# Patient Record
Sex: FEMALE | Race: WHITE | NOT HISPANIC OR LATINO | Employment: FULL TIME | URBAN - METROPOLITAN AREA
[De-identification: names, ages, dates, MRNs, and addresses within clinical notes are randomized per-mention and may not be internally consistent; named-entity substitution may affect disease eponyms.]

---

## 2019-01-18 ENCOUNTER — ANESTHESIA (OUTPATIENT)
Dept: LABOR AND DELIVERY | Facility: HOSPITAL | Age: 34
End: 2019-01-18

## 2019-01-18 ENCOUNTER — ANESTHESIA EVENT (OUTPATIENT)
Dept: LABOR AND DELIVERY | Facility: HOSPITAL | Age: 34
End: 2019-01-18

## 2019-01-18 ENCOUNTER — HOSPITAL ENCOUNTER (INPATIENT)
Facility: HOSPITAL | Age: 34
LOS: 4 days | Discharge: HOME | End: 2019-01-22
Attending: OBSTETRICS & GYNECOLOGY | Admitting: OBSTETRICS & GYNECOLOGY

## 2019-01-18 PROBLEM — Z34.90 PREGNANCY: Status: ACTIVE | Noted: 2019-01-18

## 2019-01-18 LAB
ABO GROUP BLD: NORMAL
ABO GROUP BLD: NORMAL
ANISOCYTOSIS BLD QL: NORMAL
ATMOSPHERIC PRESS: 760.2 MMHG
BASE EXCESS BLDCOV CALC-SCNC: -2.1 MMOL/L (ref -30–30)
BASOPHILS # BLD AUTO: 0.03 10*3/MM3 (ref 0–0.2)
BASOPHILS NFR BLD AUTO: 0.2 % (ref 0–1.5)
BDY SITE: ABNORMAL
BLD GP AB SCN SERPL QL: POSITIVE
DEPRECATED RDW RBC AUTO: 40.5 FL (ref 37–54)
EOSINOPHIL # BLD AUTO: 0.07 10*3/MM3 (ref 0–0.7)
EOSINOPHIL NFR BLD AUTO: 0.5 % (ref 0.3–6.2)
ERYTHROCYTE [DISTWIDTH] IN BLOOD BY AUTOMATED COUNT: 16 % (ref 11.7–13)
FETAL BLEED: NEGATIVE
GAS FLOW AIRWAY: 10 LPM
HCO3 BLDCOV-SCNC: 23.5 MMOL/L
HCT VFR BLD AUTO: 33.6 % (ref 35.6–45.5)
HGB BLD-MCNC: 10.2 G/DL (ref 11.9–15.5)
HYPOCHROMIA BLD QL: NORMAL
IMM GRANULOCYTES # BLD AUTO: 0.08 10*3/MM3 (ref 0–0.03)
IMM GRANULOCYTES NFR BLD AUTO: 0.5 % (ref 0–0.5)
LYMPHOCYTES # BLD AUTO: 1.64 10*3/MM3 (ref 0.9–4.8)
LYMPHOCYTES NFR BLD AUTO: 10.8 % (ref 19.6–45.3)
MCH RBC QN AUTO: 21.6 PG (ref 26.9–32)
MCHC RBC AUTO-ENTMCNC: 30.4 G/DL (ref 32.4–36.3)
MCV RBC AUTO: 71.2 FL (ref 80.5–98.2)
MICROCYTES BLD QL: NORMAL
MODALITY: ABNORMAL
MONOCYTES # BLD AUTO: 0.72 10*3/MM3 (ref 0.2–1.2)
MONOCYTES NFR BLD AUTO: 4.7 % (ref 5–12)
NEUTROPHILS # BLD AUTO: 12.63 10*3/MM3 (ref 1.9–8.1)
NEUTROPHILS NFR BLD AUTO: 83.3 % (ref 42.7–76)
NOTE: ABNORMAL
NUMBER OF DOSES: NORMAL
PCO2 BLDCOV: 42.5 MM HG (ref 35–51.3)
PH BLDCOV: 7.35 PH UNITS (ref 7.26–7.4)
PLAT MORPH BLD: NORMAL
PLATELET # BLD AUTO: 254 10*3/MM3 (ref 140–500)
PMV BLD AUTO: 10.8 FL (ref 6–12)
PO2 BLDCOV: 31.6 MM HG (ref 19–39)
POLYCHROMASIA BLD QL SMEAR: NORMAL
RBC # BLD AUTO: 4.72 10*6/MM3 (ref 3.9–5.2)
RESIDUAL RHIG DETECTED: NORMAL
RH BLD: NEGATIVE
RH BLD: NEGATIVE
SAO2 % BLDCOA: 57.4 % (ref 92–99)
SAO2 % BLDCOV: ABNORMAL %
STOMATOCYTES BLD QL SMEAR: NORMAL
T&S EXPIRATION DATE: NORMAL
VENTILATOR MODE: ABNORMAL
WBC MORPH BLD: NORMAL
WBC NRBC COR # BLD: 15.17 10*3/MM3 (ref 4.5–10.7)

## 2019-01-18 PROCEDURE — 86900 BLOOD TYPING SEROLOGIC ABO: CPT | Performed by: OBSTETRICS & GYNECOLOGY

## 2019-01-18 PROCEDURE — 25010000002 MORPHINE PER 10 MG: Performed by: ANESTHESIOLOGY

## 2019-01-18 PROCEDURE — 86901 BLOOD TYPING SEROLOGIC RH(D): CPT | Performed by: OBSTETRICS & GYNECOLOGY

## 2019-01-18 PROCEDURE — 86870 RBC ANTIBODY IDENTIFICATION: CPT | Performed by: OBSTETRICS & GYNECOLOGY

## 2019-01-18 PROCEDURE — 85461 HEMOGLOBIN FETAL: CPT | Performed by: OBSTETRICS & GYNECOLOGY

## 2019-01-18 PROCEDURE — 85007 BL SMEAR W/DIFF WBC COUNT: CPT | Performed by: OBSTETRICS & GYNECOLOGY

## 2019-01-18 PROCEDURE — 88307 TISSUE EXAM BY PATHOLOGIST: CPT

## 2019-01-18 PROCEDURE — 25010000002 PROMETHAZINE PER 50 MG: Performed by: ANESTHESIOLOGY

## 2019-01-18 PROCEDURE — 25010000002 RHO D IMMUNE GLOBULIN 1500 UNIT/2ML SOLUTION PREFILLED SYRINGE: Performed by: OBSTETRICS & GYNECOLOGY

## 2019-01-18 PROCEDURE — 25010000002 PHENYLEPHRINE PER 1 ML: Performed by: NURSE ANESTHETIST, CERTIFIED REGISTERED

## 2019-01-18 PROCEDURE — 25010000002 ONDANSETRON PER 1 MG: Performed by: ANESTHESIOLOGY

## 2019-01-18 PROCEDURE — 85025 COMPLETE CBC W/AUTO DIFF WBC: CPT | Performed by: OBSTETRICS & GYNECOLOGY

## 2019-01-18 PROCEDURE — 25010000003 CEFAZOLIN IN DEXTROSE 2-4 GM/100ML-% SOLUTION: Performed by: OBSTETRICS & GYNECOLOGY

## 2019-01-18 PROCEDURE — 86850 RBC ANTIBODY SCREEN: CPT | Performed by: OBSTETRICS & GYNECOLOGY

## 2019-01-18 PROCEDURE — 82803 BLOOD GASES ANY COMBINATION: CPT

## 2019-01-18 RX ORDER — OXYTOCIN-SODIUM CHLORIDE 0.9% IV SOLN 30 UNIT/500ML 30-0.9/5 UT/ML-%
125 SOLUTION INTRAVENOUS CONTINUOUS PRN
Status: COMPLETED | OUTPATIENT
Start: 2019-01-18 | End: 2019-01-18

## 2019-01-18 RX ORDER — PRENATAL VIT NO.126/IRON/FOLIC 28MG-0.8MG
1 TABLET ORAL DAILY
Status: DISCONTINUED | OUTPATIENT
Start: 2019-01-18 | End: 2019-01-22 | Stop reason: HOSPADM

## 2019-01-18 RX ORDER — ONDANSETRON 2 MG/ML
4 INJECTION INTRAMUSCULAR; INTRAVENOUS ONCE AS NEEDED
Status: COMPLETED | OUTPATIENT
Start: 2019-01-18 | End: 2019-01-18

## 2019-01-18 RX ORDER — BUPIVACAINE HYDROCHLORIDE 7.5 MG/ML
INJECTION, SOLUTION INTRASPINAL
Status: DISPENSED
Start: 2019-01-18 | End: 2019-01-18

## 2019-01-18 RX ORDER — ACETAMINOPHEN 500 MG
500 TABLET ORAL EVERY 6 HOURS PRN
COMMUNITY
End: 2019-01-22 | Stop reason: HOSPADM

## 2019-01-18 RX ORDER — DIPHENHYDRAMINE HYDROCHLORIDE 50 MG/ML
25 INJECTION INTRAMUSCULAR; INTRAVENOUS EVERY 4 HOURS PRN
Status: DISCONTINUED | OUTPATIENT
Start: 2019-01-18 | End: 2019-01-22 | Stop reason: HOSPADM

## 2019-01-18 RX ORDER — SODIUM CHLORIDE 0.9 % (FLUSH) 0.9 %
3 SYRINGE (ML) INJECTION EVERY 12 HOURS SCHEDULED
Status: DISCONTINUED | OUTPATIENT
Start: 2019-01-18 | End: 2019-01-18

## 2019-01-18 RX ORDER — SODIUM CHLORIDE 0.9 % (FLUSH) 0.9 %
3-10 SYRINGE (ML) INJECTION AS NEEDED
Status: DISCONTINUED | OUTPATIENT
Start: 2019-01-18 | End: 2019-01-18

## 2019-01-18 RX ORDER — ONDANSETRON 4 MG/1
4 TABLET, FILM COATED ORAL EVERY 8 HOURS PRN
Status: DISCONTINUED | OUTPATIENT
Start: 2019-01-18 | End: 2019-01-22 | Stop reason: HOSPADM

## 2019-01-18 RX ORDER — FAMOTIDINE 10 MG/ML
20 INJECTION, SOLUTION INTRAVENOUS ONCE AS NEEDED
Status: DISCONTINUED | OUTPATIENT
Start: 2019-01-18 | End: 2019-01-18

## 2019-01-18 RX ORDER — CARBOPROST TROMETHAMINE 250 UG/ML
250 INJECTION, SOLUTION INTRAMUSCULAR AS NEEDED
Status: DISCONTINUED | OUTPATIENT
Start: 2019-01-18 | End: 2019-01-18 | Stop reason: HOSPADM

## 2019-01-18 RX ORDER — OXYTOCIN-SODIUM CHLORIDE 0.9% IV SOLN 30 UNIT/500ML 30-0.9/5 UT/ML-%
999 SOLUTION INTRAVENOUS ONCE
Status: COMPLETED | OUTPATIENT
Start: 2019-01-18 | End: 2019-01-18

## 2019-01-18 RX ORDER — ERYTHROMYCIN 5 MG/G
OINTMENT OPHTHALMIC
Status: DISPENSED
Start: 2019-01-18 | End: 2019-01-18

## 2019-01-18 RX ORDER — SIMETHICONE 80 MG
80 TABLET,CHEWABLE ORAL 4 TIMES DAILY PRN
Status: DISCONTINUED | OUTPATIENT
Start: 2019-01-18 | End: 2019-01-22 | Stop reason: HOSPADM

## 2019-01-18 RX ORDER — IBUPROFEN 600 MG/1
600 TABLET ORAL EVERY 8 HOURS PRN
Status: DISCONTINUED | OUTPATIENT
Start: 2019-01-18 | End: 2019-01-22 | Stop reason: HOSPADM

## 2019-01-18 RX ORDER — OXYCODONE AND ACETAMINOPHEN 10; 325 MG/1; MG/1
1 TABLET ORAL EVERY 4 HOURS PRN
Status: DISCONTINUED | OUTPATIENT
Start: 2019-01-18 | End: 2019-01-22 | Stop reason: HOSPADM

## 2019-01-18 RX ORDER — PROMETHAZINE HYDROCHLORIDE 25 MG/ML
12.5 INJECTION, SOLUTION INTRAMUSCULAR; INTRAVENOUS EVERY 4 HOURS PRN
Status: DISCONTINUED | OUTPATIENT
Start: 2019-01-18 | End: 2019-01-22 | Stop reason: HOSPADM

## 2019-01-18 RX ORDER — FAMOTIDINE 10 MG/ML
20 INJECTION, SOLUTION INTRAVENOUS ONCE AS NEEDED
Status: DISCONTINUED | OUTPATIENT
Start: 2019-01-18 | End: 2019-01-18 | Stop reason: SDUPTHER

## 2019-01-18 RX ORDER — METHYLERGONOVINE MALEATE 0.2 MG/ML
200 INJECTION INTRAVENOUS ONCE AS NEEDED
Status: DISCONTINUED | OUTPATIENT
Start: 2019-01-18 | End: 2019-01-18 | Stop reason: HOSPADM

## 2019-01-18 RX ORDER — PROMETHAZINE HYDROCHLORIDE 25 MG/ML
12.5 INJECTION, SOLUTION INTRAMUSCULAR; INTRAVENOUS EVERY 6 HOURS PRN
Status: DISCONTINUED | OUTPATIENT
Start: 2019-01-18 | End: 2019-01-18

## 2019-01-18 RX ORDER — BUPIVACAINE HYDROCHLORIDE 7.5 MG/ML
INJECTION, SOLUTION EPIDURAL; RETROBULBAR
Status: COMPLETED | OUTPATIENT
Start: 2019-01-18 | End: 2019-01-18

## 2019-01-18 RX ORDER — DOCUSATE SODIUM 100 MG/1
100 CAPSULE, LIQUID FILLED ORAL 2 TIMES DAILY PRN
Status: DISCONTINUED | OUTPATIENT
Start: 2019-01-18 | End: 2019-01-22 | Stop reason: HOSPADM

## 2019-01-18 RX ORDER — ACETAMINOPHEN 500 MG
1000 TABLET ORAL ONCE
Status: DISCONTINUED | OUTPATIENT
Start: 2019-01-18 | End: 2019-01-18 | Stop reason: SDUPTHER

## 2019-01-18 RX ORDER — LIDOCAINE HYDROCHLORIDE 10 MG/ML
5 INJECTION, SOLUTION EPIDURAL; INFILTRATION; INTRACAUDAL; PERINEURAL AS NEEDED
Status: DISCONTINUED | OUTPATIENT
Start: 2019-01-18 | End: 2019-01-18

## 2019-01-18 RX ORDER — LANOLIN
CREAM (ML) TOPICAL
Status: DISCONTINUED | OUTPATIENT
Start: 2019-01-18 | End: 2019-01-22 | Stop reason: HOSPADM

## 2019-01-18 RX ORDER — ONDANSETRON 2 MG/ML
4 INJECTION INTRAMUSCULAR; INTRAVENOUS ONCE AS NEEDED
Status: DISCONTINUED | OUTPATIENT
Start: 2019-01-18 | End: 2019-01-18 | Stop reason: SDUPTHER

## 2019-01-18 RX ORDER — ACETAMINOPHEN 325 MG/1
650 TABLET ORAL EVERY 4 HOURS PRN
Status: DISCONTINUED | OUTPATIENT
Start: 2019-01-18 | End: 2019-01-22 | Stop reason: HOSPADM

## 2019-01-18 RX ORDER — RANITIDINE 150 MG/1
150 TABLET ORAL 2 TIMES DAILY
COMMUNITY
End: 2019-01-22 | Stop reason: HOSPADM

## 2019-01-18 RX ORDER — MORPHINE SULFATE 1 MG/ML
INJECTION, SOLUTION EPIDURAL; INTRATHECAL; INTRAVENOUS
Status: COMPLETED
Start: 2019-01-18 | End: 2019-01-18

## 2019-01-18 RX ORDER — OXYTOCIN-SODIUM CHLORIDE 0.9% IV SOLN 30 UNIT/500ML 30-0.9/5 UT/ML-%
250 SOLUTION INTRAVENOUS CONTINUOUS
Status: ACTIVE | OUTPATIENT
Start: 2019-01-18 | End: 2019-01-18

## 2019-01-18 RX ORDER — MORPHINE SULFATE 1 MG/ML
INJECTION, SOLUTION EPIDURAL; INTRATHECAL; INTRAVENOUS
Status: COMPLETED | OUTPATIENT
Start: 2019-01-18 | End: 2019-01-18

## 2019-01-18 RX ORDER — DIPHENHYDRAMINE HCL 25 MG
25 CAPSULE ORAL EVERY 4 HOURS PRN
Status: DISCONTINUED | OUTPATIENT
Start: 2019-01-18 | End: 2019-01-22 | Stop reason: HOSPADM

## 2019-01-18 RX ORDER — PRENATAL VIT NO.126/IRON/FOLIC 28MG-0.8MG
TABLET ORAL DAILY
COMMUNITY
End: 2021-02-01 | Stop reason: HOSPADM

## 2019-01-18 RX ORDER — PROMETHAZINE HYDROCHLORIDE 25 MG/ML
25 INJECTION, SOLUTION INTRAMUSCULAR; INTRAVENOUS EVERY 6 HOURS PRN
Status: DISCONTINUED | OUTPATIENT
Start: 2019-01-18 | End: 2019-01-22 | Stop reason: HOSPADM

## 2019-01-18 RX ORDER — OXYCODONE HYDROCHLORIDE AND ACETAMINOPHEN 5; 325 MG/1; MG/1
1 TABLET ORAL EVERY 4 HOURS PRN
Status: DISCONTINUED | OUTPATIENT
Start: 2019-01-18 | End: 2019-01-22 | Stop reason: HOSPADM

## 2019-01-18 RX ORDER — MISOPROSTOL 200 UG/1
800 TABLET ORAL AS NEEDED
Status: DISCONTINUED | OUTPATIENT
Start: 2019-01-18 | End: 2019-01-18 | Stop reason: HOSPADM

## 2019-01-18 RX ORDER — NALOXONE HCL 0.4 MG/ML
0.2 VIAL (ML) INJECTION
Status: DISCONTINUED | OUTPATIENT
Start: 2019-01-18 | End: 2019-01-22 | Stop reason: HOSPADM

## 2019-01-18 RX ORDER — ACETAMINOPHEN 500 MG
1000 TABLET ORAL ONCE
Status: COMPLETED | OUTPATIENT
Start: 2019-01-18 | End: 2019-01-18

## 2019-01-18 RX ORDER — MORPHINE SULFATE 2 MG/ML
2 INJECTION, SOLUTION INTRAMUSCULAR; INTRAVENOUS
Status: ACTIVE | OUTPATIENT
Start: 2019-01-18 | End: 2019-01-19

## 2019-01-18 RX ORDER — HYDROMORPHONE HYDROCHLORIDE 1 MG/ML
0.5 INJECTION, SOLUTION INTRAMUSCULAR; INTRAVENOUS; SUBCUTANEOUS
Status: ACTIVE | OUTPATIENT
Start: 2019-01-18 | End: 2019-01-19

## 2019-01-18 RX ORDER — CEFAZOLIN SODIUM 2 G/100ML
2 INJECTION, SOLUTION INTRAVENOUS ONCE
Status: COMPLETED | OUTPATIENT
Start: 2019-01-18 | End: 2019-01-18

## 2019-01-18 RX ORDER — FAMOTIDINE 20 MG/1
20 TABLET, FILM COATED ORAL DAILY
Status: DISCONTINUED | OUTPATIENT
Start: 2019-01-19 | End: 2019-01-22 | Stop reason: HOSPADM

## 2019-01-18 RX ORDER — PHYTONADIONE 2 MG/ML
INJECTION, EMULSION INTRAMUSCULAR; INTRAVENOUS; SUBCUTANEOUS
Status: DISPENSED
Start: 2019-01-18 | End: 2019-01-18

## 2019-01-18 RX ORDER — SODIUM CHLORIDE, SODIUM LACTATE, POTASSIUM CHLORIDE, CALCIUM CHLORIDE 600; 310; 30; 20 MG/100ML; MG/100ML; MG/100ML; MG/100ML
125 INJECTION, SOLUTION INTRAVENOUS CONTINUOUS
Status: DISCONTINUED | OUTPATIENT
Start: 2019-01-18 | End: 2019-01-18

## 2019-01-18 RX ADMIN — CEFAZOLIN SODIUM 2 G: 2 INJECTION, SOLUTION INTRAVENOUS at 11:27

## 2019-01-18 RX ADMIN — MORPHINE SULFATE 200 MCG: 1 INJECTION EPIDURAL; INTRATHECAL; INTRAVENOUS at 11:38

## 2019-01-18 RX ADMIN — OXYTOCIN 999 ML/HR: 10 INJECTION INTRAVENOUS at 11:51

## 2019-01-18 RX ADMIN — PHENYLEPHRINE HYDROCHLORIDE 200 MCG: 10 INJECTION INTRAVENOUS at 11:45

## 2019-01-18 RX ADMIN — ACETAMINOPHEN 1000 MG: 500 TABLET, FILM COATED ORAL at 11:27

## 2019-01-18 RX ADMIN — HUMAN RHO(D) IMMUNE GLOBULIN 1500 UNITS: 1500 SOLUTION INTRAMUSCULAR; INTRAVENOUS at 16:51

## 2019-01-18 RX ADMIN — OXYTOCIN 125 ML/HR: 10 INJECTION INTRAVENOUS at 13:42

## 2019-01-18 RX ADMIN — SODIUM CHLORIDE, POTASSIUM CHLORIDE, SODIUM LACTATE AND CALCIUM CHLORIDE 125 ML/HR: 600; 310; 30; 20 INJECTION, SOLUTION INTRAVENOUS at 11:20

## 2019-01-18 RX ADMIN — BUPIVACAINE HYDROCHLORIDE 1.8 ML: 7.5 INJECTION, SOLUTION EPIDURAL; RETROBULBAR at 11:38

## 2019-01-18 RX ADMIN — IBUPROFEN 600 MG: 600 TABLET ORAL at 16:33

## 2019-01-18 RX ADMIN — PHENYLEPHRINE HYDROCHLORIDE 200 MCG: 10 INJECTION INTRAVENOUS at 11:41

## 2019-01-18 RX ADMIN — PROMETHAZINE HYDROCHLORIDE 12.5 MG: 25 INJECTION INTRAMUSCULAR; INTRAVENOUS at 16:22

## 2019-01-18 RX ADMIN — ONDANSETRON 4 MG: 2 INJECTION INTRAMUSCULAR; INTRAVENOUS at 13:49

## 2019-01-18 RX ADMIN — SODIUM CHLORIDE, POTASSIUM CHLORIDE, SODIUM LACTATE AND CALCIUM CHLORIDE 1000 ML: 600; 310; 30; 20 INJECTION, SOLUTION INTRAVENOUS at 10:29

## 2019-01-18 RX ADMIN — SODIUM CHLORIDE, POTASSIUM CHLORIDE, SODIUM LACTATE AND CALCIUM CHLORIDE 500 ML: 600; 310; 30; 20 INJECTION, SOLUTION INTRAVENOUS at 12:37

## 2019-01-18 RX ADMIN — ONDANSETRON 4 MG: 2 INJECTION INTRAMUSCULAR; INTRAVENOUS at 11:27

## 2019-01-18 NOTE — ANESTHESIA PROCEDURE NOTES
Spinal Block      Patient location during procedure: OB  Performed By  Anesthesiologist: Suraj Molina MD  Preanesthetic Checklist  Completed: patient identified, site marked, surgical consent, pre-op evaluation, timeout performed, IV checked, risks and benefits discussed and monitors and equipment checked  Spinal Block Prep:  Patient Position:sitting  Sterile Tech:cap, gloves, mask and sterile barriers  Prep:Chloraprep  Patient Monitoring:blood pressure monitoring, continuous pulse oximetry and EKG  Spinal Block Procedure  Approach:midline  Guidance:landmark technique and palpation technique  Location:L4-L5  Needle Type:Sprotte  Needle Gauge:24  Placement of Spinal needle event:cerebrospinal fluid aspirated    Fluid Appearance:clear  Medications: Morphine PF injection, 200 mcg  bupivacaine PF (MARCAINE) 0.75 % injection, 1.8 mL   Post Assessment  Patient Tolerance:patient tolerated the procedure well with no apparent complications  Complications no  Additional Notes  1.8cc 0.75%bupivicaine in dextrose

## 2019-01-18 NOTE — H&P
[facility]  Obstetric History and Physical    Patient Name: Joanna Fleming  :  1985  MRN:  [id number]      Chief Complaint   Patient presents with   • Scheduled      pt presented to office with KASH of 2 and confirmed breech        Subjective     Patient is a 33 y.o. female  currently at 36w6d, who presents from clinic for delivery due to significant oligohydramnios with KASH 2 and breech presentation.       Her prenatal care has been otherwise uncomplicated.      Objective       Vital Signs Range for the last 24 hours  Temperature: Temp:  [98.1 °F (36.7 °C)] 98.1 °F (36.7 °C)   Temp Source: Temp src: Oral   BP: BP: (127-147)/(76-88) 137/85   Pulse: Heart Rate:  [104-130] 104   Respirations: Resp:  [18] 18                   Physical Examination:     General :  Alert in NAD  Abdomen: Gravid, EFW 7.5 by leopolds    Presentation: breech       Fetal Heart Rate Assessment   Method: Fetal HR Assessment Method: intermittent auscultation, using Doppler   Beats/min: Fetal HR (beats/min): 136   Baseline:     Varibility:     Accels:     Decels:     Tracing Category:       Uterine Assessment   Method:     Frequency (min):     Ctx Count in 10 min:     Duration:     Intensity:     Intensity by IUPC:     Resting Tone:     Resting Tone by IUPC:     [place] Units:           Results from last 7 days   Lab Units 19  1030   WBC 10*3/mm3 15.17*   HEMOGLOBIN g/dL 10.2*   HEMATOCRIT % 33.6*   PLATELETS 10*3/mm3 254       Assessment/Plan     1.  Intrauterine pregnancy at 36w6d weeks gestation with oligohydramnios KASH 2, breech, and NRFHT with normal baseline but minimal variability and no accels after fluid resuscitation.   Will proceed now with  delivery. Plan of care has been reviewed with patient and family.  All questions answered.      Pregnancy        H&P updated. The patient was examined and the following changes are noted above.         Maddi Rhoades MD  2019  12:33 PM

## 2019-01-18 NOTE — ANESTHESIA PREPROCEDURE EVALUATION
Anesthesia Evaluation     Patient summary reviewed and Nursing notes reviewed                Airway   Mallampati: II  TM distance: >3 FB  Neck ROM: full  Dental - normal exam     Pulmonary - negative pulmonary ROS and normal exam   Cardiovascular - negative cardio ROS and normal exam        Neuro/Psych  (+) headaches,     GI/Hepatic/Renal/Endo - negative ROS     Musculoskeletal (-) negative ROS    Abdominal    Substance History - negative use     OB/GYN    (+) Pregnant,         Other                        Anesthesia Plan    ASA 2     epidural     Anesthetic plan, all risks, benefits, and alternatives have been provided, discussed and informed consent has been obtained with: patient.

## 2019-01-18 NOTE — PLAN OF CARE
Problem: Patient Care Overview  Goal: Plan of Care Review  Outcome: Ongoing (interventions implemented as appropriate)   19 1250   Coping/Psychosocial   Plan of Care Reviewed With patient;spouse   Plan of Care Review   Progress improving   OTHER   Outcome Summary patient delivered via c/s for breech presentation and oligo. Mom and baby healthy. baby in nursery.     Goal: Individualization and Mutuality  Outcome: Ongoing (interventions implemented as appropriate)   19 1250   Individualization   Patient Specific Preferences baby updates as soon as possible   Patient Specific Goals (Include Timeframe) transfer to [facility] within hour   Patient Specific Interventions control pain below 4/10   Mutuality/Individual Preferences   What Anxieties, Fears, Concerns, or Questions Do You Have About Your Care? first child, first    What Information Would Help Us Give You More Personalized Care? patient is dermatology NP   How Would You and/or Your Support Person Like to Participate in Your Care? present at delivery and with baby in nursery   Mutuality/Individual Preferences   How to Address Anxieties/Fears explain plan of care

## 2019-01-18 NOTE — NURSING NOTE
CRNA at bedside to Fazal administration due to symptomatic low blood pressures. See anesthesia flow sheet for medication administration.

## 2019-01-18 NOTE — OP NOTE
[facility]   Section Operative Note    Patient Name: Joanna Fleming  :  1985  MRN:  [id number]      Date of procedure:  2019        Pre-Operative Dx:   1.  IUP at 36w6d weeks   2. Breech    3. Severe oligohydramnios, KASH 2     Postoperative Dx:  Same        Procedure: , Low Transverse      Surgeon: Maddi Rhoades MD      Assistant: Dr. Garcia     Anesthesia: Spinal    EBL: 600cc         Specimens: placenta     Findings:                           Amniotic Fluid minimal, clear  Placenta Intact, 3 VC  Uterus normal  Tubes and ovaries normal bilaterally              Infant:            Gender: Viable female  infant     Weight: 3345 g (7 lb 6 oz)     Apgars: 8   @ 1 minute /     8   @ 5 minutes                 Indication for C/Section:     Breech  , oligohydramnios              Procedure Details:  The patient was taken to the operating room where spinal anesthesia was found to be adequate. She was prepped and draped in the usual sterile manner in the dorsal supine position with leftward tilt. A Pfannenstiel incision was made and carried down to the fascia. The fascia was incised in the mid-line and extended transversely with Conley scissors. The fascia was grasped and elevated and  from the underlying rectus muscles superiorly and inferiorly. The peritoneum was identified and entered. Peritoneal incision was extended superiorly and inferiorly with good visualization of the bladder. The bladder blade was inserted and the vesicouterine peritoneum was incised transversely and the bladder flap was created digitally. The bladder blade was reinserted. The  lower uterine segment was incised in a transverse fashion.  The legs was elevated and delivered through the incision. The remainder of the infant was delivered without difficulty using standard breech maneuvers. The umbilical cord was clamped and cut and the infant was handed off the field. Cord ph and cord bloods were obtained. The  placenta was removed intact and appeared normal. The uterine incision was closed with running locked sutures of 0 chromic. Second layer closure was placed imbricating the first for hemostasis. The abdominal cavity was irrigated and cleared of all clot and debris. The uterine incision was inspected and noted to be hemostatic. Rectus muscles were reapproximated in the midline with 0 vicryl.  The fascia was closed with 0 vicryl in running continuous fashion. The subcutaneous tissue was closed with 3-0 vicryl. The skin was closed in subcuticular fashion with 4-0 Vicryl.   Instrument, sponge, and needle counts were correct prior the abdominal closure and at the conclusion of the case. Mother and baby  to recovery room in stable condition.              Complications:     None          Antibiotics: cefazolin (Ancef)                      Maddi Rhoades MD  1/18/2019  12:39 PM

## 2019-01-18 NOTE — LACTATION NOTE
P1. 36 wkr per leonardo/38 wks by dates.  Assisted with waking infant and latching.  Nipple shield used at first but then able to latch to left breast without for short amount of time.  HGP to bedside and pt to start tonight if infant skips a feeding or in the am for insurance pumping.  Reviewed feeding patterns, how to know infant getting enough, and milk supply.  Pt to call LC as needed.    Lactation Consult Note    Evaluation Completed: 2019 6:26 PM  Patient Name: Joanna Fleming  :  1985  MRN:  [id number]     REFERRAL  INFORMATION:                          Date of Referral: 19   Person Making Referral: patient  Maternal Reason for Referral: breastfeeding currently       DELIVERY HISTORY:          Skin to skin initiation date/time:        Skin to skin end date/time:              MATERNAL ASSESSMENT:  Breast Size Issue: none (19 : Jennifer Barros, RN)  Breast Shape: Bilateral:, pendulous (19 : Jennifer Barros, RN)  Breast Density: Bilateral:, soft (19 : Jennifer Barros, RN)  Areola: Bilateral:, elastic (19 : Jennifer Barros, RN)  Nipples: Bilateral:, graspable (19 : Jennifer Barros, RN)                INFANT ASSESSMENT:  Information for the patient's :  Nate Fleming [[id number]]   No past medical history on file.    Feeding Readiness Cues: rooting (19 : Jennifer Barros, RN)   Feeding Method: breastfeeding (19 : Jennifer Barros, RN)   Feeding Tolerance/Success: adequate pause for breath, suck inconsistent, suck weak (19 : Jennifer Barros, RN)       Satiety Cues: calm after feeding (19 : Jennifer Barros, RN)           Feeding Interventions: lips stroked, latch assistance provided, sucking promoted (19 : Jennifer Barros, RN)   Nutrition Interventions: lactation consult initiated (19 : Jennifer Barros, RN)   Additional Documentation: LATCH Score (Group) (19 : Fiorella  KELLY Rodriguez)           Breastfeeding: breastfeeding, bilateral (19 : Jennifer Barros RN)   Infant Positioning: clutch/football, cross-cradle (19 : Jennifer Barros RN)   Breastfeeding Time, Left (min): 2 (19 : Jennifer Barros RN)   Breastfeeding Time, Right (min): 10 (19 : Jennifer Barros RN)   Effective Latch During Feeding: yes (19 : Jennifer Barros RN)   Suck/Swallow Coordination: present (19 : Jennifer Barros, RN)   Signs of Milk Transfer: infant jaw motion present (19 : Jennifer Barros RN)       Latch: 1-->repeated attempts, holds nipple in mouth, stimulate to suck (19 : Jennifer Barros, RN)   Audible Swallowin-->none (19 : Jennifer Barros, RN)   Type of Nipple: 2-->everted (after stimulation) (19 : Jennifer Barros, RN)   Comfort (Breast/Nipple): 2-->soft/nontender (19 : Jennifer Barros, RN)   Hold (Positioning): 1-->minimal assist, teach one side, mother does other, staff holds (19 : Jennifer Barros, RN)   Latch Score: 6 (19 : Jennifer Barros, RN)     Infant-Driven Feeding Scales - Readiness: Alert once handled. Some rooting or takes pacifier. Adequate tone. (19 : Jennifer Barros, RN)   Infant-Driven Feeding Scales - Quality: Nipples with a strong coordinated SSB but fatigues with progression. (19 : Jennifer Barros, KELLY)             MATERNAL INFANT FEEDING:  Maternal Preparation: breast care (19 : Jennifer Barros, RN)  Maternal Emotional State: assist needed (19 : Jennifer Barros, RN)  Infant Positioning: clutch/football, cross-cradle (19 : Jennifer Barros, RN)   Signs of Milk Transfer: infant jaw motion present (19 : Jennifer Barros, RN)  Pain with Feeding: no (19 : Jennifer Barros, RN)           Milk Ejection Reflex: present (19 : Jennifer Barros, RN)  Comfort Measures Following Feeding:  air-drying encouraged (01/18/19 1823 : Jennifer Barros, RN)        Latch Assistance: yes (01/18/19 1823 : Jennifer Barros, RN)                               EQUIPMENT TYPE:  Breast Pump Type: double electric, hospital grade (01/18/19 1823 : Jennifer Barros, RN)  Breast Pump Flange Type: hard (01/18/19 1823 : Jennifer Barros, RN)  Breast Pump Flange Size: 24 mm (01/18/19 1823 : Jennifer Barros, RN)                        BREAST PUMPING:  Breast Pumping Interventions: early pumping promoted (01/18/19 1823 : Jennifer Barros, RN)       LACTATION REFERRALS:

## 2019-01-18 NOTE — PLAN OF CARE
Problem: Patient Care Overview  Goal: Discharge Needs Assessment  Outcome: Ongoing (interventions implemented as appropriate)   19 1035 19 1038 19 1247   Discharge Needs Assessment   Readmission Within the Last 30 Days --  --  no previous admission in last 30 days   Patient/Family Anticipates Transition to home --  --    Patient/Family Anticipated Services at Transition none --  --    Transportation Anticipated car, drives self --  --    Disability   Equipment Currently Used at Home --  none --      Goal: Interprofessional Rounds/Family Conf  Outcome: Ongoing (interventions implemented as appropriate)   19 1247   Interdisciplinary Rounds/Family Conf   Participants family;nursing;patient;respiratory therapy;physician       Problem:  Delivery (Adult,Obstetrics,Pediatric)  Goal: Signs and Symptoms of Listed Potential Problems Will be Absent, Minimized or Managed ( Delivery)  Outcome: Ongoing (interventions implemented as appropriate)   19 1247   Goal/Outcome Evaluation   Problems Assessed ( Delivery) all   Problems Present ( Delivery) none     Goal: Signs and Symptoms of Listed Potential Problems Will be Absent, Minimized or Managed ( Delivery)  Outcome: Ongoing (interventions implemented as appropriate)   19 1247   Goal/Outcome Evaluation   Problems Assessed ( Delivery) all   Problems Present ( Delivery) none       Problem: Postpartum ( Delivery) (Adult,Obstetrics,Pediatric)  Goal: Signs and Symptoms of Listed Potential Problems Will be Absent, Minimized or Managed (Postpartum)  Outcome: Ongoing (interventions implemented as appropriate)   19 1247   Goal/Outcome Evaluation   Problems Assessed (Postpartum ) all   Problems Present (Postpartum ) none     Goal: Anesthesia/Sedation Recovery  Outcome: Ongoing (interventions implemented as appropriate)   19 1247   Goal/Outcome Evaluation    Anesthesia/Sedation Recovery criteria met for discharge       Problem: Anesthesia/Analgesia, Neuraxial (Obstetrics)  Goal: Signs and Symptoms of Listed Potential Problems Will be Absent, Minimized or Managed (Anesthesia/Analgesia, Neuraxial)  Outcome: Ongoing (interventions implemented as appropriate)   19 1247   Goal/Outcome Evaluation   Problems Assessed (Neuraxial Anesthesia/Analgesia, OB) all   Problems Present (Neuraxial Anesth OB) hypotension       Problem: Fall Risk,  (Adult,Obstetrics,Pediatric)  Goal: Identify Related Risk Factors and Signs and Symptoms  Outcome: Ongoing (interventions implemented as appropriate)   19 1247   Fall Risk,  (Adult,Obstetrics,Pediatric)   Related Risk Factors (Fall Risk, ) significant blood loss;regional anesthesia;pain severe;pregnancy weight gain;medication side effects;medical devices;hypotension   Signs and Symptoms (Fall Risk, ) presence of fall risk factors     Goal: Absence of Maternal Fall  Outcome: Ongoing (interventions implemented as appropriate)   19 1247   Fall Risk,  (Adult,Obstetrics,Pediatric)   Absence of Maternal Fall achieves outcome     Goal: Absence of  Fall/Drop  Outcome: Ongoing (interventions implemented as appropriate)   19 1247   Fall Risk,  (Adult,Obstetrics,Pediatric)   Absence of Preston Fall/Drop achieves outcome

## 2019-01-18 NOTE — ANESTHESIA POSTPROCEDURE EVALUATION
Patient: Joanna Fleming    Procedure Summary     Date:  19 Room / Location:   [facility]    Anesthesia Start:  1138 Anesthesia Stop:  1234    Procedure:   SECTION PRIMARY (N/A Abdomen) Diagnosis:      Surgeon:  Maddi Rhoades MD Provider:  Suraj Molina MD    Anesthesia Type:  epidural ASA Status:  Not recorded          Anesthesia Type: epidural  Last vitals  BP   106/60 (19 1633)   Temp   36.7 °C (98 °F) (19 1633)   Pulse   111 (19 1633)   Resp   18 (19 1633)     SpO2   100 % (19 1633)     Post Anesthesia Care and Evaluation    Patient location during evaluation: PACU  Patient participation: complete - patient participated  Level of consciousness: awake  Pain score: 2  Pain management: adequate  Airway patency: patent  Anesthetic complications: No anesthetic complications  PONV Status: none  Cardiovascular status: acceptable  Respiratory status: acceptable  Hydration status: acceptable

## 2019-01-19 LAB
BASOPHILS # BLD AUTO: 0.02 10*3/MM3 (ref 0–0.2)
BASOPHILS NFR BLD AUTO: 0.2 % (ref 0–1.5)
DEPRECATED RDW RBC AUTO: 40.3 FL (ref 37–54)
EOSINOPHIL # BLD AUTO: 0.08 10*3/MM3 (ref 0–0.7)
EOSINOPHIL NFR BLD AUTO: 0.6 % (ref 0.3–6.2)
ERYTHROCYTE [DISTWIDTH] IN BLOOD BY AUTOMATED COUNT: 16 % (ref 11.7–13)
HCT VFR BLD AUTO: 30.5 % (ref 35.6–45.5)
HGB BLD-MCNC: 9.3 G/DL (ref 11.9–15.5)
IMM GRANULOCYTES # BLD AUTO: 0.06 10*3/MM3 (ref 0–0.03)
IMM GRANULOCYTES NFR BLD AUTO: 0.5 % (ref 0–0.5)
LYMPHOCYTES # BLD AUTO: 1.2 10*3/MM3 (ref 0.9–4.8)
LYMPHOCYTES NFR BLD AUTO: 9.6 % (ref 19.6–45.3)
MCH RBC QN AUTO: 21.8 PG (ref 26.9–32)
MCHC RBC AUTO-ENTMCNC: 30.5 G/DL (ref 32.4–36.3)
MCV RBC AUTO: 71.4 FL (ref 80.5–98.2)
MONOCYTES # BLD AUTO: 0.64 10*3/MM3 (ref 0.2–1.2)
MONOCYTES NFR BLD AUTO: 5.1 % (ref 5–12)
NEUTROPHILS # BLD AUTO: 10.45 10*3/MM3 (ref 1.9–8.1)
NEUTROPHILS NFR BLD AUTO: 84 % (ref 42.7–76)
PLATELET # BLD AUTO: 244 10*3/MM3 (ref 140–500)
PMV BLD AUTO: 10.7 FL (ref 6–12)
RBC # BLD AUTO: 4.27 10*6/MM3 (ref 3.9–5.2)
WBC NRBC COR # BLD: 12.45 10*3/MM3 (ref 4.5–10.7)

## 2019-01-19 PROCEDURE — 85025 COMPLETE CBC W/AUTO DIFF WBC: CPT | Performed by: OBSTETRICS & GYNECOLOGY

## 2019-01-19 RX ADMIN — OXYCODONE HYDROCHLORIDE AND ACETAMINOPHEN 1 TABLET: 5; 325 TABLET ORAL at 10:04

## 2019-01-19 RX ADMIN — Medication 1 TABLET: at 10:04

## 2019-01-19 RX ADMIN — OXYCODONE HYDROCHLORIDE AND ACETAMINOPHEN 1 TABLET: 10; 325 TABLET ORAL at 22:50

## 2019-01-19 RX ADMIN — IBUPROFEN 600 MG: 600 TABLET ORAL at 00:26

## 2019-01-19 RX ADMIN — OXYCODONE HYDROCHLORIDE AND ACETAMINOPHEN 1 TABLET: 5; 325 TABLET ORAL at 05:32

## 2019-01-19 RX ADMIN — FAMOTIDINE 20 MG: 20 TABLET, FILM COATED ORAL at 10:03

## 2019-01-19 RX ADMIN — IBUPROFEN 600 MG: 600 TABLET ORAL at 16:10

## 2019-01-19 RX ADMIN — IBUPROFEN 600 MG: 600 TABLET ORAL at 08:34

## 2019-01-19 RX ADMIN — OXYCODONE HYDROCHLORIDE AND ACETAMINOPHEN 1 TABLET: 10; 325 TABLET ORAL at 17:53

## 2019-01-19 RX ADMIN — OXYCODONE HYDROCHLORIDE AND ACETAMINOPHEN 1 TABLET: 10; 325 TABLET ORAL at 13:45

## 2019-01-19 RX ADMIN — Medication 1 APPLICATION: at 00:52

## 2019-01-19 NOTE — LACTATION NOTE
P1. Assisted mom with waking and latching baby. Baby sleepy at breast. Educated and assisted mom with hand expression. Fed baby with 0.4 cc of colostrum and then baby latched only to nipple shield and BF for 10 min with 2 cc of glucose water. Encouraged mom to feed on demand but no longer than every 2-3 hours. Mom will hand express with each BF or pump. Encouraged to call if needing further assistance  Lactation Consult Note    Evaluation Completed: 2019 10:08 AM  Patient Name: Joanna Fleming  :  1985  MRN:  [id number]     REFERRAL  INFORMATION:                          Date of Referral: 19   Person Making Referral: nurse  Maternal Reason for Referral: breastfeeding currently       DELIVERY HISTORY:          Skin to skin initiation date/time:        Skin to skin end date/time:              MATERNAL ASSESSMENT:  Breast Size Issue: none (19 : Maggie Randall RN)  Breast Shape: round (19 : Maggie Randall, RN)  Breast Density: soft (19 : Maggie Randall RN)  Areola: elastic (19 : Maggie Randall RN)  Nipples: graspable (19 : Maggie Randall, RN)                INFANT ASSESSMENT:  Information for the patient's :  Nate Fleming [[id number]]   No past medical history on file.        Feeding Method: breastfeeding (19 : Maggie Randall RN)   Feeding Tolerance/Success: adequate pause for breath, arousal required, coordinated suck, coordinated swallow, sleepy, strong suck(good suck at times) (19 : Maggie Randall, RN)                   Feeding Interventions: arousal required, cheeks stroked, latch assistance provided, rest periods provided, sucking promoted (19 : Maggie Randall, RN)       Additional Documentation: LATCH Score (Group) (19 : Maggie Randall RN)               Infant Positioning: clutch/football (19 : Maggie Randall  RN)           Effective Latch During Feeding: yes (19 : Maggie Randall RN)   Suck/Swallow Coordination: present (19 : Maggie Randall RN)   Signs of Milk Transfer: infant jaw motion present (19 : Maggie Randall RN)       Latch: 1-->repeated attempts, holds nipple in mouth, stimulate to suck (19 : Maggie Randall RN)   Audible Swallowin-->none (19 : Maggie Randall RN)   Type of Nipple: 2-->everted (after stimulation) (19 : Maggie Randall RN)   Comfort (Breast/Nipple): 2-->soft/nontender (19 : Maggie Randall RN)   Hold (Positioning): 1-->minimal assist, teach one side, mother does other, staff holds (19 : Maggie Randall RN)   Latch Score: 6 (19 : Maggie Randall RN)                       MATERNAL INFANT FEEDING:     Maternal Emotional State: relaxed (19 : Maggie Randall RN)  Infant Positioning: clutch/football (19 : Maggie Randall RN)   Signs of Milk Transfer: infant jaw motion present (19 : Maggie Randall RN)  Pain with Feeding: no (19 : Maggie Randall RN)        Comfort Measures Before/During Feeding: infant position adjusted, latch adjusted (19 : Maggie Randall RN)     Comfort Measures Following Feeding: air-drying encouraged (19 : Maggie Randall RN)        Latch Assistance: yes (19 : Maggie Randall RN)                               EQUIPMENT TYPE:  Breast Pump Type: double electric, hospital grade (19 : Maggie Randall, RN)  Breast Pump Flange Type: hard (19 : Maggie Randall, RN)  Breast Pump Flange Size: 24 mm (19 : Maggie Randall, RN)       Breastfeeding Assistance: assisted with positioning, infant latch-on verified(hand expression) (19 : Mgagie Randall, RN)                 BREAST PUMPING:  Breast Pumping Interventions: frequent pumping encouraged (01/19/19 0900 : Maggei Randall RN)       LACTATION REFERRALS:

## 2019-01-19 NOTE — PLAN OF CARE
Problem: Patient Care Overview  Goal: Plan of Care Review  Outcome: Ongoing (interventions implemented as appropriate)      Problem:  Delivery (Adult,Obstetrics,Pediatric)  Goal: Signs and Symptoms of Listed Potential Problems Will be Absent, Minimized or Managed ( Delivery)  Outcome: Ongoing (interventions implemented as appropriate)      Problem: Postpartum ( Delivery) (Adult,Obstetrics,Pediatric)  Goal: Signs and Symptoms of Listed Potential Problems Will be Absent, Minimized or Managed (Postpartum)  Outcome: Ongoing (interventions implemented as appropriate)      Problem: Skin Injury Risk (Adult)  Goal: Identify Related Risk Factors and Signs and Symptoms  Outcome: Ongoing (interventions implemented as appropriate)      Problem: Breastfeeding (Adult,Obstetrics,Pediatric)  Goal: Signs and Symptoms of Listed Potential Problems Will be Absent, Minimized or Managed (Breastfeeding)  Outcome: Ongoing (interventions implemented as appropriate)

## 2019-01-19 NOTE — PROGRESS NOTES
[facility]   PROGRESS NOTE    Patient Name: Joanna Fleming  :  1985  MRN:  [id number]      Post-Op Day 1 S/P    Delivered a female infant.  Subjective     Patient reports:    Pain is well controlled. Voiding and ambulating without difficulty. Tolerating po. Lochia normal.       The patient plans to breastfeed.         Objective       Vitals: Vital Signs Range for the last 24 hours  Temperature: Temp:  [97.9 °F (36.6 °C)-99.6 °F (37.6 °C)] 97.9 °F (36.6 °C)   Temp Source: Temp src: Oral   BP: BP: (106-125)/(60-79) 117/75   Pulse: Heart Rate:  [] 96   Respirations: Resp:  [16-18] 18         Intake/Output Summary (Last 24 hours) at 2019 1526  Last data filed at 2019 1137  Gross per 24 hour   Intake 3604 ml   Output 4550 ml   Net -946 ml                                              Physical Exam     General Alert and awake, in NAD      CV Regular rate and rhythm      Lungs clear to auscultation bilaterally        Abdomen Soft, non-distended, fundus firm,  2 fb below umbilicus, appropriately tender      Incision  Dressing clean, dry and intact.      Extremities  trace edema, calves NT               LABS: Results from last 7 days   Lab Units 19  0850 19  1030   WBC 10*3/mm3 12.45* 15.17*   HEMOGLOBIN g/dL 9.3* 10.2*   HEMATOCRIT % 30.5* 33.6*   PLATELETS 10*3/mm3 244 254         Prenatal labs results reviewed:  Yes   Rubella:  Immune  Rh Status:    RH type   Date Value Ref Range Status   2019 Negative  Final     Comment:     RhIG IS Indicated. Baby is Rh Positive                       Assessment/Plan   : 1. POD 1 S/P C/S: Hemodynamically stable.  Doing well.  Continue routine care. Had anemia of pregnancy - slightly more after C/S but no symptoms.       Pregnancy          Mike Lakhani MD  2019  3:26 PM

## 2019-01-20 RX ADMIN — OXYCODONE HYDROCHLORIDE AND ACETAMINOPHEN 1 TABLET: 10; 325 TABLET ORAL at 15:19

## 2019-01-20 RX ADMIN — DOCUSATE SODIUM 100 MG: 100 CAPSULE, LIQUID FILLED ORAL at 08:28

## 2019-01-20 RX ADMIN — Medication 1 TABLET: at 08:28

## 2019-01-20 RX ADMIN — OXYCODONE HYDROCHLORIDE AND ACETAMINOPHEN 1 TABLET: 10; 325 TABLET ORAL at 05:22

## 2019-01-20 RX ADMIN — OXYCODONE HYDROCHLORIDE AND ACETAMINOPHEN 1 TABLET: 10; 325 TABLET ORAL at 22:42

## 2019-01-20 RX ADMIN — IBUPROFEN 600 MG: 600 TABLET ORAL at 00:12

## 2019-01-20 RX ADMIN — OXYCODONE HYDROCHLORIDE AND ACETAMINOPHEN 1 TABLET: 10; 325 TABLET ORAL at 10:51

## 2019-01-20 RX ADMIN — IBUPROFEN 600 MG: 600 TABLET ORAL at 08:28

## 2019-01-20 RX ADMIN — IBUPROFEN 600 MG: 600 TABLET ORAL at 17:00

## 2019-01-20 NOTE — PROGRESS NOTES
[facility]   PROGRESS NOTE    Patient Name: Joanna Fleming  :  1985  MRN:  [id number]      Post-Op 2 S/P   Subjective     Patient reports:   Doing well. Pain well controlled. Tolerating regular diet and having flatus.    Lochia normal.       Objective       Vitals: Vital Signs Range for the last 24 hours  Temperature: Temp:  [97.8 °F (36.6 °C)-98.2 °F (36.8 °C)] 97.8 °F (36.6 °C)       BP: BP: (102-120)/(71-79) 120/77   Pulse: Heart Rate:  [84-94] 94   Respirations: Resp:  [16-18] 18                                         Physical Exam     General Alert       Abdomen Soft, non-distended, fundus firm,  appropriately tender      Incision  Intact, no erythema or exudate      Extremities Calves NT bilaterally                Assessment/Plan  :   POD 2  -  Doing well.  Continue usual care.           Pregnancy               Alice Leija MD  2019  10:47 AM

## 2019-01-20 NOTE — LACTATION NOTE
Mom reports baby doesn't BF long and falls asleep. She has been pumping and giving back colostrum around 1cc. When observing latch, baby is not getting her bottom lip down and mostly against the nipple. Reviewed importance of deep latching and ways to achieve that with parents. Assisted mom with latching baby to left breast and baby started nursing well. Mom reports she was not doing this. Encouraged mom to call if needing assistance latching baby to right side    Lactation Consult Note    Evaluation Completed: 2019 1:12 PM  Patient Name: Joanna Fleming  :  1985  MRN:  [id number]     REFERRAL  INFORMATION:                          Date of Referral: 19   Person Making Referral: nurse  Maternal Reason for Referral: breastfeeding currently       DELIVERY HISTORY:          Skin to skin initiation date/time:        Skin to skin end date/time:              MATERNAL ASSESSMENT:                               INFANT ASSESSMENT:  Information for the patient's :  Ntae Fleming [[id number]]   No past medical history on file.                                                                                                                                MATERNAL INFANT FEEDING:                                                                       EQUIPMENT TYPE:                                 BREAST PUMPING:          LACTATION REFERRALS:

## 2019-01-21 PROBLEM — Z34.90 PREGNANCY: Status: RESOLVED | Noted: 2019-01-18 | Resolved: 2019-01-21

## 2019-01-21 RX ADMIN — OXYCODONE HYDROCHLORIDE AND ACETAMINOPHEN 1 TABLET: 10; 325 TABLET ORAL at 10:56

## 2019-01-21 RX ADMIN — DOCUSATE SODIUM 100 MG: 100 CAPSULE, LIQUID FILLED ORAL at 22:38

## 2019-01-21 RX ADMIN — OXYCODONE HYDROCHLORIDE AND ACETAMINOPHEN 1 TABLET: 10; 325 TABLET ORAL at 22:38

## 2019-01-21 RX ADMIN — IBUPROFEN 600 MG: 600 TABLET ORAL at 03:00

## 2019-01-21 RX ADMIN — IBUPROFEN 600 MG: 600 TABLET ORAL at 18:28

## 2019-01-21 RX ADMIN — IBUPROFEN 600 MG: 600 TABLET ORAL at 10:57

## 2019-01-21 RX ADMIN — Medication 1 TABLET: at 10:56

## 2019-01-21 RX ADMIN — OXYCODONE HYDROCHLORIDE AND ACETAMINOPHEN 1 TABLET: 10; 325 TABLET ORAL at 03:00

## 2019-01-21 RX ADMIN — OXYCODONE HYDROCHLORIDE AND ACETAMINOPHEN 1 TABLET: 10; 325 TABLET ORAL at 18:28

## 2019-01-21 RX ADMIN — DOCUSATE SODIUM 100 MG: 100 CAPSULE, LIQUID FILLED ORAL at 10:56

## 2019-01-21 NOTE — LACTATION NOTE
Patient requesting help with latch . Baby Jamie is eager but slips off nipple once latched . Assisted with deep latch and reviewed what constitutes a nutritive suckle. Given info for rental of HGP. Breasts are filling .    Lactation Consult Note    Evaluation Completed: 2019 8:17 AM  Patient Name: Joanna Fleming  :  1985  MRN:  [id number]     REFERRAL  INFORMATION:                          Date of Referral: 19   Person Making Referral: patient, nurse  Maternal Reason for Referral: breastfeeding currently  Infant Reason for Referral: 35-37 weeks gestation    DELIVERY HISTORY:          Skin to skin initiation date/time:        Skin to skin end date/time:              MATERNAL ASSESSMENT:  Breast Size Issue: none (19 0809 : Kourtney Clements RN)  Breast Shape: round, pendulous (19 08 : Kourtney Clements, RN)  Breast Density: filling (19 0809 : Kourtney Clements, KELLY)  Areola: elastic (19 0809 : Kourtney Clements RN)     Nipple Width: 1.5 cm (19 0809 : Kourtney Clements, RN)             INFANT ASSESSMENT:  Information for the patient's :  Nate Fleming [[id number]]   No past medical history on file.                                                                                                                                MATERNAL INFANT FEEDING:  Maternal Preparation: breast care, hand hygiene (19 0809 : Kourtney Clements, RN)  Maternal Emotional State: assist needed (19 0809 : Kourtney Clements RN)  Infant Positioning: clutch/football (19 0809 : Kourtney Clements, RN)                  Milk Ejection Reflex: present (19 0809 : Kourtney Clements, RN)  Comfort Measures Following Feeding: air-drying encouraged, expressed milk applied, water cleansing encouraged (19 0809 : Kourtney Clements, RN)        Latch Assistance: yes (19 0809 : Kourtney Clements, RN)                               EQUIPMENT  TYPE:  Breast Pump Type: double electric, hospital grade (01/21/19 0809 : Kourtney Clements, RN)  Breast Pump Flange Type: hard (01/21/19 0809 : Kourtney Clements, RN)  Breast Pump Flange Size: 24 mm (01/21/19 0809 : Kourtney Clements, RN)       Breastfeeding Assistance: assisted with techniques for flat/inverted nipples, electric breast pump used, feeding cue recognition promoted, feeding session observed, hand expression, infant latch-on verified, infant stimulated to wakeful state, support offered (01/21/19 0809 : Kourntey Clements, RN)     Breastfeeding Support: encouragement provided, lactation counseling provided, maternal hydration promoted, maternal rest encouraged (01/21/19 0809 : Kourtney Clements, RN)          BREAST PUMPING:  Breast Pumping Interventions: frequent pumping encouraged, post-feed pumping encouraged (01/21/19 0809 : Kourtney Clements, RN)  Breast Pumping: bilateral breasts pumped until soft, double electric breast pump utilized (01/21/19 0809 : Kourtney Clements, RN)    LACTATION REFERRALS:  Lactation Referrals: outpatient lactation program (01/21/19 0809 : Kourtney Clements, RN)

## 2019-01-21 NOTE — PLAN OF CARE
Problem: Patient Care Overview  Goal: Plan of Care Review   01/21/19 1529   Coping/Psychosocial   Plan of Care Reviewed With patient   Plan of Care Review   Progress improving

## 2019-01-21 NOTE — PROGRESS NOTES
[facility]   PROGRESS NOTE    Patient Name: Joanna Fleming  :  1985  MRN:  [id number]      Post-Op 3 S/P   Subjective     Patient reports:   Doing well. Pain well controlled. Tolerating regular diet and having flatus.    Lochia normal.       Objective       Vitals: Vital Signs Range for the last 24 hours  Temperature: Temp:  [97.4 °F (36.3 °C)-98 °F (36.7 °C)] 97.4 °F (36.3 °C)       BP: BP: (117-120)/(70-76) 120/70   Pulse: Heart Rate:  [] 95   Respirations: Resp:  [18] 18                                         Physical Exam     General Alert       Abdomen Soft, non-distended, fundus firm, 1 below umbilicus, appropriately tender      Incision  Intact, no erythema or exudate      Extremities Calves NT bilaterally                Assessment/Plan  :   POD 3  -  Doing well.  Continue usual cares.           * No active hospital problems. *               Olya Danielle, APRN  2019  9:34 AM

## 2019-01-21 NOTE — LACTATION NOTE
Baby Jamie has a bgm of 62 and mama's milk is in so she will feed back EBM after every feeding. Reviewed page 43 in booklet regarding milk storage.

## 2019-01-22 VITALS
HEART RATE: 81 BPM | OXYGEN SATURATION: 98 % | HEIGHT: 64 IN | RESPIRATION RATE: 16 BRPM | DIASTOLIC BLOOD PRESSURE: 85 MMHG | TEMPERATURE: 97.6 F | SYSTOLIC BLOOD PRESSURE: 125 MMHG

## 2019-01-22 RX ORDER — OXYCODONE HYDROCHLORIDE AND ACETAMINOPHEN 5; 325 MG/1; MG/1
2 TABLET ORAL EVERY 4 HOURS PRN
Qty: 30 TABLET | Refills: 0 | Status: SHIPPED | OUTPATIENT
Start: 2019-01-22 | End: 2019-01-25

## 2019-01-22 RX ORDER — IBUPROFEN 600 MG/1
600 TABLET ORAL EVERY 8 HOURS PRN
Qty: 30 TABLET | Refills: 3 | Status: SHIPPED | OUTPATIENT
Start: 2019-01-22 | End: 2020-12-23

## 2019-01-22 RX ADMIN — OXYCODONE HYDROCHLORIDE AND ACETAMINOPHEN 1 TABLET: 10; 325 TABLET ORAL at 06:27

## 2019-01-22 RX ADMIN — IBUPROFEN 600 MG: 600 TABLET ORAL at 10:40

## 2019-01-22 RX ADMIN — IBUPROFEN 600 MG: 600 TABLET ORAL at 02:35

## 2019-01-22 RX ADMIN — DOCUSATE SODIUM 100 MG: 100 CAPSULE, LIQUID FILLED ORAL at 09:40

## 2019-01-22 RX ADMIN — OXYCODONE HYDROCHLORIDE AND ACETAMINOPHEN 1 TABLET: 10; 325 TABLET ORAL at 02:35

## 2019-01-22 RX ADMIN — Medication 1 TABLET: at 09:40

## 2019-01-22 RX ADMIN — OXYCODONE HYDROCHLORIDE AND ACETAMINOPHEN 1 TABLET: 10; 325 TABLET ORAL at 10:40

## 2019-01-22 RX ADMIN — Medication 80 MG: at 09:40

## 2019-01-22 NOTE — DISCHARGE SUMMARY
Date of Discharge:  2019    Discharge Diagnosis: primary c/s    Presenting Problem/History of Present Illness  Pregnancy [Z34.90]       Hospital Course  Patient is a 33 y.o. female presented with oligo hydramnios and breech presentation.  Underwent primary c/s.  Delivered viable female infant per Dr. Rhoades.  No pp complications.  Ready for d/c.      Procedures Performed  Procedure(s):   SECTION PRIMARY         Condition on Discharge:  Post-Op Day 4 S/P   Subjective     Patient reports:    Doing well - ready for discharge. Pain well controlled. Tolerating po and   having flatus. Voiding and ambulating without difficulty. Lochia normal.     Vital Signs  Temp:  [97.6 °F (36.4 °C)-98.4 °F (36.9 °C)] 97.6 °F (36.4 °C)  Heart Rate:  [81-87] 81  Resp:  [16-18] 16  BP: (118-125)/(83-85) 125/85    Physical Exam    Gen Alert and awake   Abdomen Soft, ND,  Fundus firm with minimal tenderness   Incision  Intact, without erythema or exudate; steri strips intact   Extremities Calves NT bilaterally     Assessment/Plan ]   Assessment:  POD 4  -  Doing well. Stable for discharge.     Plan:    Instructions reviewed       Consults:   Consults     No orders found from 2018 to 2019.          Discharge Disposition  Home or Self Care    Discharge Medications     Discharge Medications      New Medications      Instructions Start Date   ibuprofen 600 MG tablet  Commonly known as:  ADVIL,MOTRIN   600 mg, Oral, Every 8 Hours PRN      oxyCODONE-acetaminophen 5-325 MG per tablet  Commonly known as:  PERCOCET   2 tablets, Oral, Every 4 Hours PRN         Continue These Medications      Instructions Start Date   prenatal (CLASSIC) vitamin 28-0.8 MG tablet tablet   Oral, Daily         Stop These Medications    acetaminophen 500 MG tablet  Commonly known as:  TYLENOL     raNITIdine 150 MG tablet  Commonly known as:  ZANTAC            The patient has been prescribed a controlled substance.  She has been counseled on  the risks associated with using the medication.   The addictive potential of this medication and alternatives were discussed carefully with this patient and she demonstrated understanding.  A YOLANDA report has been obtained and reviewed.    Activity at Discharge:     Follow-up Appointments  No future appointments.      Test Results Pending at Discharge       BELLA Christie  01/22/19  9:15 AM

## 2019-01-22 NOTE — PLAN OF CARE
Problem: Patient Care Overview  Goal: Plan of Care Review  Outcome: Ongoing (interventions implemented as appropriate)   19   Coping/Psychosocial   Plan of Care Reviewed With patient   Plan of Care Review   Progress improving   OTHER   Outcome Summary VSS, bleeding wnl, incision clean/dry, BF & EBP/ supplementing with formula per mom request.       Problem: Postpartum ( Delivery) (Adult,Obstetrics,Pediatric)  Goal: Signs and Symptoms of Listed Potential Problems Will be Absent, Minimized or Managed (Postpartum)  Outcome: Ongoing (interventions implemented as appropriate)   19   Goal/Outcome Evaluation   Problems Assessed (Postpartum ) all   Problems Present (Postpartum ) pain  (taking PO meds for pain management.)       Problem: Breastfeeding (Adult,Obstetrics,Pediatric)  Goal: Signs and Symptoms of Listed Potential Problems Will be Absent, Minimized or Managed (Breastfeeding)  Outcome: Ongoing (interventions implemented as appropriate)   19   Goal/Outcome Evaluation   Problems Assessed (Breastfeeding) all   Problems Present (Breastfeeding) engorgement  (Milk comming in/ breasts firm)

## 2020-07-01 LAB
EXTERNAL HEPATITIS B SURFACE ANTIGEN: NEGATIVE
EXTERNAL HEPATITIS C AB: NORMAL
EXTERNAL RUBELLA QUALITATIVE: NORMAL
EXTERNAL SYPHILIS RPR SCREEN: NORMAL
HIV1 P24 AG SERPL QL IA: NORMAL

## 2020-12-23 ENCOUNTER — LAB (OUTPATIENT)
Dept: LAB | Facility: HOSPITAL | Age: 35
End: 2020-12-23

## 2020-12-23 ENCOUNTER — CONSULT (OUTPATIENT)
Dept: ONCOLOGY | Facility: CLINIC | Age: 35
End: 2020-12-23

## 2020-12-23 VITALS
TEMPERATURE: 97.5 F | SYSTOLIC BLOOD PRESSURE: 110 MMHG | OXYGEN SATURATION: 95 % | HEART RATE: 119 BPM | HEIGHT: 63 IN | BODY MASS INDEX: 34.91 KG/M2 | DIASTOLIC BLOOD PRESSURE: 70 MMHG | WEIGHT: 197 LBS | RESPIRATION RATE: 16 BRPM

## 2020-12-23 DIAGNOSIS — O99.013 ANEMIA AFFECTING PREGNANCY IN THIRD TRIMESTER: Primary | ICD-10-CM

## 2020-12-23 DIAGNOSIS — O99.019 ANTEPARTUM ANEMIA: Primary | ICD-10-CM

## 2020-12-23 PROBLEM — D63.1 ANEMIA DUE TO CHRONIC KIDNEY DISEASE: Status: RESOLVED | Noted: 2020-12-23 | Resolved: 2020-12-23

## 2020-12-23 PROBLEM — N18.9 ANEMIA DUE TO CHRONIC KIDNEY DISEASE: Status: RESOLVED | Noted: 2020-12-23 | Resolved: 2020-12-23

## 2020-12-23 PROBLEM — D63.1 ANEMIA DUE TO CHRONIC KIDNEY DISEASE: Status: ACTIVE | Noted: 2020-12-23

## 2020-12-23 PROBLEM — N18.9 ANEMIA DUE TO CHRONIC KIDNEY DISEASE: Status: ACTIVE | Noted: 2020-12-23

## 2020-12-23 LAB
ALBUMIN SERPL-MCNC: 3.7 G/DL (ref 3.5–5.2)
ALBUMIN/GLOB SERPL: 1.2 G/DL (ref 1.1–2.4)
ALP SERPL-CCNC: 88 U/L (ref 38–116)
ALT SERPL W P-5'-P-CCNC: 8 U/L (ref 0–33)
ANION GAP SERPL CALCULATED.3IONS-SCNC: 12 MMOL/L (ref 5–15)
AST SERPL-CCNC: 18 U/L (ref 0–32)
BASOPHILS # BLD AUTO: 0.02 10*3/MM3 (ref 0–0.2)
BASOPHILS NFR BLD AUTO: 0.2 % (ref 0–1.5)
BILIRUB SERPL-MCNC: 0.5 MG/DL (ref 0.2–1.2)
BUN SERPL-MCNC: 9 MG/DL (ref 6–20)
BUN/CREAT SERPL: 14.1 (ref 7.3–30)
CALCIUM SPEC-SCNC: 9.6 MG/DL (ref 8.5–10.2)
CHLORIDE SERPL-SCNC: 102 MMOL/L (ref 98–107)
CO2 SERPL-SCNC: 22 MMOL/L (ref 22–29)
CREAT SERPL-MCNC: 0.64 MG/DL (ref 0.6–1.1)
DEPRECATED RDW RBC AUTO: 35.7 FL (ref 37–54)
EOSINOPHIL # BLD AUTO: 0.1 10*3/MM3 (ref 0–0.4)
EOSINOPHIL NFR BLD AUTO: 0.8 % (ref 0.3–6.2)
ERYTHROCYTE [DISTWIDTH] IN BLOOD BY AUTOMATED COUNT: 15.1 % (ref 12.3–15.4)
FERRITIN SERPL-MCNC: 98.2 NG/ML (ref 11–207)
FOLATE SERPL-MCNC: >20 NG/ML (ref 4.78–24.2)
GFR SERPL CREATININE-BSD FRML MDRD: 106 ML/MIN/1.73
GLOBULIN UR ELPH-MCNC: 3 GM/DL (ref 1.8–3.5)
GLUCOSE SERPL-MCNC: 121 MG/DL (ref 74–124)
HCT VFR BLD AUTO: 34.2 % (ref 34–46.6)
HGB BLD-MCNC: 11 G/DL (ref 12–15.9)
IMM GRANULOCYTES # BLD AUTO: 0.07 10*3/MM3 (ref 0–0.05)
IMM GRANULOCYTES NFR BLD AUTO: 0.6 % (ref 0–0.5)
IRON 24H UR-MRATE: 128 MCG/DL (ref 37–145)
IRON SATN MFR SERPL: 28 % (ref 14–48)
LDH SERPL-CCNC: 159 U/L (ref 99–259)
LYMPHOCYTES # BLD AUTO: 1.66 10*3/MM3 (ref 0.7–3.1)
LYMPHOCYTES NFR BLD AUTO: 13.2 % (ref 19.6–45.3)
MCH RBC QN AUTO: 21.7 PG (ref 26.6–33)
MCHC RBC AUTO-ENTMCNC: 32.2 G/DL (ref 31.5–35.7)
MCV RBC AUTO: 67.6 FL (ref 79–97)
MONOCYTES # BLD AUTO: 0.44 10*3/MM3 (ref 0.1–0.9)
MONOCYTES NFR BLD AUTO: 3.5 % (ref 5–12)
NEUTROPHILS NFR BLD AUTO: 10.33 10*3/MM3 (ref 1.7–7)
NEUTROPHILS NFR BLD AUTO: 81.7 % (ref 42.7–76)
NRBC BLD AUTO-RTO: 0 /100 WBC (ref 0–0.2)
PLATELET # BLD AUTO: 247 10*3/MM3 (ref 140–450)
PMV BLD AUTO: 11.4 FL (ref 6–12)
POTASSIUM SERPL-SCNC: 4.1 MMOL/L (ref 3.5–4.7)
PROT SERPL-MCNC: 6.7 G/DL (ref 6.3–8)
RBC # BLD AUTO: 5.06 10*6/MM3 (ref 3.77–5.28)
SODIUM SERPL-SCNC: 136 MMOL/L (ref 134–145)
TIBC SERPL-MCNC: 461 MCG/DL (ref 249–505)
TRANSFERRIN SERPL-MCNC: 329 MG/DL (ref 200–360)
VIT B12 BLD-MCNC: 418 PG/ML (ref 211–946)
WBC # BLD AUTO: 12.62 10*3/MM3 (ref 3.4–10.8)

## 2020-12-23 PROCEDURE — 99243 OFF/OP CNSLTJ NEW/EST LOW 30: CPT | Performed by: INTERNAL MEDICINE

## 2020-12-23 PROCEDURE — 82607 VITAMIN B-12: CPT | Performed by: INTERNAL MEDICINE

## 2020-12-23 PROCEDURE — 36415 COLL VENOUS BLD VENIPUNCTURE: CPT

## 2020-12-23 PROCEDURE — 82728 ASSAY OF FERRITIN: CPT | Performed by: INTERNAL MEDICINE

## 2020-12-23 PROCEDURE — 83615 LACTATE (LD) (LDH) ENZYME: CPT | Performed by: INTERNAL MEDICINE

## 2020-12-23 PROCEDURE — 84466 ASSAY OF TRANSFERRIN: CPT | Performed by: INTERNAL MEDICINE

## 2020-12-23 PROCEDURE — 82746 ASSAY OF FOLIC ACID SERUM: CPT | Performed by: INTERNAL MEDICINE

## 2020-12-23 PROCEDURE — 80053 COMPREHEN METABOLIC PANEL: CPT | Performed by: INTERNAL MEDICINE

## 2020-12-23 PROCEDURE — 85025 COMPLETE CBC W/AUTO DIFF WBC: CPT

## 2020-12-23 PROCEDURE — 83540 ASSAY OF IRON: CPT | Performed by: INTERNAL MEDICINE

## 2020-12-30 ENCOUNTER — TELEPHONE (OUTPATIENT)
Dept: ONCOLOGY | Facility: CLINIC | Age: 35
End: 2020-12-30

## 2020-12-30 ENCOUNTER — DOCUMENTATION (OUTPATIENT)
Dept: ONCOLOGY | Facility: HOSPITAL | Age: 35
End: 2020-12-30

## 2020-12-30 NOTE — TELEPHONE ENCOUNTER
Pt said her labs were normal and she needs to cancel her iron infusions for tomorrow and the two after that.  It was on VM I don't know if she plans on keeping the doctor appt or not.

## 2020-12-30 NOTE — PROGRESS NOTES
Iron labs reviewed with Dr. Wu. Pt doesn't qualify for IV iron at this time.  She was scheduled for venofer infusions for the next 3 weeks.  She was also scheduled to have repeat iron labs and MD visit with him 1/22/21.  He wants venofer infusions cancelled and move MD visit back 3-4 weeks and will recheck labs that day. Message sent to Maicol with the above appt adjustments and I requested Maicol call her with the new changes.

## 2020-12-31 ENCOUNTER — APPOINTMENT (OUTPATIENT)
Dept: ONCOLOGY | Facility: HOSPITAL | Age: 35
End: 2020-12-31

## 2021-01-08 ENCOUNTER — APPOINTMENT (OUTPATIENT)
Dept: ONCOLOGY | Facility: HOSPITAL | Age: 36
End: 2021-01-08

## 2021-01-08 LAB — EXTERNAL GROUP B STREP ANTIGEN: POSITIVE

## 2021-01-29 ENCOUNTER — HOSPITAL ENCOUNTER (INPATIENT)
Facility: HOSPITAL | Age: 36
LOS: 3 days | Discharge: HOME | End: 2021-02-01
Attending: OBSTETRICS & GYNECOLOGY | Admitting: OBSTETRICS & GYNECOLOGY

## 2021-01-29 ENCOUNTER — ANESTHESIA (OUTPATIENT)
Dept: LABOR AND DELIVERY | Facility: HOSPITAL | Age: 36
End: 2021-01-29

## 2021-01-29 ENCOUNTER — ANESTHESIA EVENT (OUTPATIENT)
Dept: LABOR AND DELIVERY | Facility: HOSPITAL | Age: 36
End: 2021-01-29

## 2021-01-29 DIAGNOSIS — Z3A.39 39 WEEKS GESTATION OF PREGNANCY: Primary | ICD-10-CM

## 2021-01-29 PROBLEM — Z34.90 PREGNANCY: Status: ACTIVE | Noted: 2021-01-29

## 2021-01-29 LAB
ABO GROUP BLD: NORMAL
ABO GROUP BLD: NORMAL
BLD GP AB SCN SERPL QL: NEGATIVE
DEPRECATED RDW RBC AUTO: 36.7 FL (ref 37–54)
ERYTHROCYTE [DISTWIDTH] IN BLOOD BY AUTOMATED COUNT: 15.5 % (ref 12.3–15.4)
FETAL BLEED: NEGATIVE
HCT VFR BLD AUTO: 35 % (ref 34–46.6)
HGB BLD-MCNC: 11 G/DL (ref 12–15.9)
MCH RBC QN AUTO: 21.3 PG (ref 26.6–33)
MCHC RBC AUTO-ENTMCNC: 31.4 G/DL (ref 31.5–35.7)
MCV RBC AUTO: 67.8 FL (ref 79–97)
NUMBER OF DOSES: NORMAL
PLATELET # BLD AUTO: 261 10*3/MM3 (ref 140–450)
PMV BLD AUTO: 12.6 FL (ref 6–12)
RBC # BLD AUTO: 5.16 10*6/MM3 (ref 3.77–5.28)
RH BLD: NEGATIVE
RH BLD: NEGATIVE
SARS-COV-2 RNA RESP QL NAA+PROBE: NOT DETECTED
T&S EXPIRATION DATE: NORMAL
WBC # BLD AUTO: 10.56 10*3/MM3 (ref 3.4–10.8)

## 2021-01-29 PROCEDURE — U0003 INFECTIOUS AGENT DETECTION BY NUCLEIC ACID (DNA OR RNA); SEVERE ACUTE RESPIRATORY SYNDROME CORONAVIRUS 2 (SARS-COV-2) (CORONAVIRUS DISEASE [COVID-19]), AMPLIFIED PROBE TECHNIQUE, MAKING USE OF HIGH THROUGHPUT TECHNOLOGIES AS DESCRIBED BY CMS-2020-01-R: HCPCS | Performed by: OBSTETRICS & GYNECOLOGY

## 2021-01-29 PROCEDURE — 25010000002 MORPHINE PER 10 MG: Performed by: ANESTHESIOLOGY

## 2021-01-29 PROCEDURE — 86850 RBC ANTIBODY SCREEN: CPT | Performed by: OBSTETRICS & GYNECOLOGY

## 2021-01-29 PROCEDURE — 86900 BLOOD TYPING SEROLOGIC ABO: CPT | Performed by: OBSTETRICS & GYNECOLOGY

## 2021-01-29 PROCEDURE — 25010000002 FENTANYL CITRATE (PF) 100 MCG/2ML SOLUTION: Performed by: ANESTHESIOLOGY

## 2021-01-29 PROCEDURE — 25010000002 RHO D IMMUNE GLOBULIN 1500 UNIT/2ML SOLUTION PREFILLED SYRINGE: Performed by: OBSTETRICS & GYNECOLOGY

## 2021-01-29 PROCEDURE — 85027 COMPLETE CBC AUTOMATED: CPT | Performed by: OBSTETRICS & GYNECOLOGY

## 2021-01-29 PROCEDURE — 25010000002 PHENYLEPHRINE PER 1 ML: Performed by: NURSE ANESTHETIST, CERTIFIED REGISTERED

## 2021-01-29 PROCEDURE — 85461 HEMOGLOBIN FETAL: CPT | Performed by: OBSTETRICS & GYNECOLOGY

## 2021-01-29 PROCEDURE — 86901 BLOOD TYPING SEROLOGIC RH(D): CPT | Performed by: OBSTETRICS & GYNECOLOGY

## 2021-01-29 PROCEDURE — 25010000003 CEFAZOLIN IN DEXTROSE 2-4 GM/100ML-% SOLUTION: Performed by: OBSTETRICS & GYNECOLOGY

## 2021-01-29 PROCEDURE — 25010000002 ONDANSETRON PER 1 MG: Performed by: OBSTETRICS & GYNECOLOGY

## 2021-01-29 PROCEDURE — 25010000002 ONDANSETRON PER 1 MG: Performed by: NURSE ANESTHETIST, CERTIFIED REGISTERED

## 2021-01-29 RX ORDER — ONDANSETRON 2 MG/ML
4 INJECTION INTRAMUSCULAR; INTRAVENOUS ONCE AS NEEDED
Status: DISCONTINUED | OUTPATIENT
Start: 2021-01-29 | End: 2021-02-01 | Stop reason: HOSPADM

## 2021-01-29 RX ORDER — HYDROMORPHONE HYDROCHLORIDE 1 MG/ML
0.5 INJECTION, SOLUTION INTRAMUSCULAR; INTRAVENOUS; SUBCUTANEOUS
Status: ACTIVE | OUTPATIENT
Start: 2021-01-29 | End: 2021-01-30

## 2021-01-29 RX ORDER — OXYCODONE HYDROCHLORIDE AND ACETAMINOPHEN 5; 325 MG/1; MG/1
1 TABLET ORAL EVERY 4 HOURS PRN
Status: DISCONTINUED | OUTPATIENT
Start: 2021-01-29 | End: 2021-02-01 | Stop reason: HOSPADM

## 2021-01-29 RX ORDER — ONDANSETRON 4 MG/1
4 TABLET, FILM COATED ORAL EVERY 8 HOURS PRN
Status: DISCONTINUED | OUTPATIENT
Start: 2021-01-29 | End: 2021-02-01 | Stop reason: HOSPADM

## 2021-01-29 RX ORDER — NALOXONE HCL 0.4 MG/ML
0.2 VIAL (ML) INJECTION
Status: DISCONTINUED | OUTPATIENT
Start: 2021-01-29 | End: 2021-02-01 | Stop reason: HOSPADM

## 2021-01-29 RX ORDER — FAMOTIDINE 20 MG/1
20 TABLET, FILM COATED ORAL
Status: DISCONTINUED | OUTPATIENT
Start: 2021-01-29 | End: 2021-01-29

## 2021-01-29 RX ORDER — DIPHENHYDRAMINE HYDROCHLORIDE 50 MG/ML
25 INJECTION INTRAMUSCULAR; INTRAVENOUS EVERY 4 HOURS PRN
Status: DISCONTINUED | OUTPATIENT
Start: 2021-01-29 | End: 2021-02-01 | Stop reason: HOSPADM

## 2021-01-29 RX ORDER — BUPIVACAINE HYDROCHLORIDE 7.5 MG/ML
INJECTION, SOLUTION EPIDURAL; RETROBULBAR
Status: COMPLETED | OUTPATIENT
Start: 2021-01-29 | End: 2021-01-29

## 2021-01-29 RX ORDER — DIPHENHYDRAMINE HCL 25 MG
25 CAPSULE ORAL EVERY 4 HOURS PRN
Status: DISCONTINUED | OUTPATIENT
Start: 2021-01-29 | End: 2021-02-01 | Stop reason: HOSPADM

## 2021-01-29 RX ORDER — CEFAZOLIN SODIUM 2 G/100ML
2 INJECTION, SOLUTION INTRAVENOUS ONCE
Status: COMPLETED | OUTPATIENT
Start: 2021-01-29 | End: 2021-01-29

## 2021-01-29 RX ORDER — SODIUM CHLORIDE 0.9 % (FLUSH) 0.9 %
10 SYRINGE (ML) INJECTION AS NEEDED
Status: DISCONTINUED | OUTPATIENT
Start: 2021-01-29 | End: 2021-01-29 | Stop reason: HOSPADM

## 2021-01-29 RX ORDER — ERYTHROMYCIN 5 MG/G
OINTMENT OPHTHALMIC
Status: DISPENSED
Start: 2021-01-29 | End: 2021-01-29

## 2021-01-29 RX ORDER — ACETAMINOPHEN 325 MG/1
650 TABLET ORAL ONCE
Status: DISCONTINUED | OUTPATIENT
Start: 2021-01-29 | End: 2021-02-01 | Stop reason: HOSPADM

## 2021-01-29 RX ORDER — IBUPROFEN 600 MG/1
600 TABLET ORAL EVERY 8 HOURS PRN
Status: DISCONTINUED | OUTPATIENT
Start: 2021-01-29 | End: 2021-02-01 | Stop reason: HOSPADM

## 2021-01-29 RX ORDER — OXYTOCIN-SODIUM CHLORIDE 0.9% IV SOLN 30 UNIT/500ML 30-0.9/5 UT/ML-%
125 SOLUTION INTRAVENOUS CONTINUOUS PRN
Status: COMPLETED | OUTPATIENT
Start: 2021-01-29 | End: 2021-01-29

## 2021-01-29 RX ORDER — DOCUSATE SODIUM 100 MG/1
100 CAPSULE, LIQUID FILLED ORAL 2 TIMES DAILY PRN
Status: DISCONTINUED | OUTPATIENT
Start: 2021-01-29 | End: 2021-02-01 | Stop reason: HOSPADM

## 2021-01-29 RX ORDER — METHYLERGONOVINE MALEATE 0.2 MG/ML
200 INJECTION INTRAVENOUS ONCE AS NEEDED
Status: DISCONTINUED | OUTPATIENT
Start: 2021-01-29 | End: 2021-01-29 | Stop reason: HOSPADM

## 2021-01-29 RX ORDER — FENTANYL CITRATE 50 UG/ML
INJECTION, SOLUTION INTRAMUSCULAR; INTRAVENOUS
Status: COMPLETED | OUTPATIENT
Start: 2021-01-29 | End: 2021-01-29

## 2021-01-29 RX ORDER — ACETAMINOPHEN 500 MG
1000 TABLET ORAL ONCE
Status: COMPLETED | OUTPATIENT
Start: 2021-01-29 | End: 2021-01-29

## 2021-01-29 RX ORDER — OXYTOCIN-SODIUM CHLORIDE 0.9% IV SOLN 30 UNIT/500ML 30-0.9/5 UT/ML-%
250 SOLUTION INTRAVENOUS CONTINUOUS PRN
Status: ACTIVE | OUTPATIENT
Start: 2021-01-29 | End: 2021-01-29

## 2021-01-29 RX ORDER — LANOLIN
CREAM (ML) TOPICAL
Status: DISCONTINUED | OUTPATIENT
Start: 2021-01-29 | End: 2021-02-01 | Stop reason: HOSPADM

## 2021-01-29 RX ORDER — OXYTOCIN-SODIUM CHLORIDE 0.9% IV SOLN 30 UNIT/500ML 30-0.9/5 UT/ML-%
999 SOLUTION INTRAVENOUS ONCE
Status: COMPLETED | OUTPATIENT
Start: 2021-01-29 | End: 2021-01-29

## 2021-01-29 RX ORDER — CARBOPROST TROMETHAMINE 250 UG/ML
250 INJECTION, SOLUTION INTRAMUSCULAR AS NEEDED
Status: DISCONTINUED | OUTPATIENT
Start: 2021-01-29 | End: 2021-01-29 | Stop reason: HOSPADM

## 2021-01-29 RX ORDER — FAMOTIDINE 10 MG/ML
20 INJECTION, SOLUTION INTRAVENOUS ONCE AS NEEDED
Status: COMPLETED | OUTPATIENT
Start: 2021-01-29 | End: 2021-01-29

## 2021-01-29 RX ORDER — ONDANSETRON 2 MG/ML
INJECTION INTRAMUSCULAR; INTRAVENOUS AS NEEDED
Status: DISCONTINUED | OUTPATIENT
Start: 2021-01-29 | End: 2021-01-29 | Stop reason: SURG

## 2021-01-29 RX ORDER — PHYTONADIONE 1 MG/.5ML
INJECTION, EMULSION INTRAMUSCULAR; INTRAVENOUS; SUBCUTANEOUS
Status: DISPENSED
Start: 2021-01-29 | End: 2021-01-29

## 2021-01-29 RX ORDER — PRENATAL VIT/IRON FUM/FOLIC AC 27MG-0.8MG
1 TABLET ORAL DAILY
Status: DISCONTINUED | OUTPATIENT
Start: 2021-01-29 | End: 2021-02-01 | Stop reason: HOSPADM

## 2021-01-29 RX ORDER — SODIUM CHLORIDE, SODIUM LACTATE, POTASSIUM CHLORIDE, CALCIUM CHLORIDE 600; 310; 30; 20 MG/100ML; MG/100ML; MG/100ML; MG/100ML
125 INJECTION, SOLUTION INTRAVENOUS CONTINUOUS
Status: DISCONTINUED | OUTPATIENT
Start: 2021-01-29 | End: 2021-01-29

## 2021-01-29 RX ORDER — ONDANSETRON 2 MG/ML
4 INJECTION INTRAMUSCULAR; INTRAVENOUS EVERY 6 HOURS PRN
Status: DISCONTINUED | OUTPATIENT
Start: 2021-01-29 | End: 2021-02-01 | Stop reason: HOSPADM

## 2021-01-29 RX ORDER — SIMETHICONE 80 MG
80 TABLET,CHEWABLE ORAL 4 TIMES DAILY PRN
Status: DISCONTINUED | OUTPATIENT
Start: 2021-01-29 | End: 2021-02-01 | Stop reason: HOSPADM

## 2021-01-29 RX ORDER — EPHEDRINE SULFATE 50 MG/ML
INJECTION, SOLUTION INTRAVENOUS AS NEEDED
Status: DISCONTINUED | OUTPATIENT
Start: 2021-01-29 | End: 2021-01-29 | Stop reason: SURG

## 2021-01-29 RX ORDER — ACETAMINOPHEN 500 MG
500 TABLET ORAL EVERY 6 HOURS PRN
COMMUNITY
End: 2021-02-01 | Stop reason: HOSPADM

## 2021-01-29 RX ORDER — MORPHINE SULFATE 2 MG/ML
2 INJECTION, SOLUTION INTRAMUSCULAR; INTRAVENOUS
Status: ACTIVE | OUTPATIENT
Start: 2021-01-29 | End: 2021-01-30

## 2021-01-29 RX ORDER — SODIUM CHLORIDE 0.9 % (FLUSH) 0.9 %
3 SYRINGE (ML) INJECTION EVERY 12 HOURS SCHEDULED
Status: DISCONTINUED | OUTPATIENT
Start: 2021-01-29 | End: 2021-01-29 | Stop reason: HOSPADM

## 2021-01-29 RX ORDER — MORPHINE SULFATE 1 MG/ML
INJECTION, SOLUTION EPIDURAL; INTRATHECAL; INTRAVENOUS
Status: COMPLETED | OUTPATIENT
Start: 2021-01-29 | End: 2021-01-29

## 2021-01-29 RX ORDER — OXYCODONE AND ACETAMINOPHEN 10; 325 MG/1; MG/1
1 TABLET ORAL EVERY 4 HOURS PRN
Status: DISCONTINUED | OUTPATIENT
Start: 2021-01-29 | End: 2021-02-01 | Stop reason: HOSPADM

## 2021-01-29 RX ORDER — OXYTOCIN-SODIUM CHLORIDE 0.9% IV SOLN 30 UNIT/500ML 30-0.9/5 UT/ML-%
SOLUTION INTRAVENOUS
Status: COMPLETED
Start: 2021-01-29 | End: 2021-01-29

## 2021-01-29 RX ORDER — MISOPROSTOL 200 UG/1
800 TABLET ORAL AS NEEDED
Status: DISCONTINUED | OUTPATIENT
Start: 2021-01-29 | End: 2021-01-29 | Stop reason: HOSPADM

## 2021-01-29 RX ADMIN — HUMAN RHO(D) IMMUNE GLOBULIN 1500 UNITS: 1500 SOLUTION INTRAMUSCULAR; INTRAVENOUS at 20:39

## 2021-01-29 RX ADMIN — SODIUM CHLORIDE, POTASSIUM CHLORIDE, SODIUM LACTATE AND CALCIUM CHLORIDE 1000 ML: 600; 310; 30; 20 INJECTION, SOLUTION INTRAVENOUS at 08:09

## 2021-01-29 RX ADMIN — CEFAZOLIN SODIUM 2 G: 2 INJECTION, SOLUTION INTRAVENOUS at 10:31

## 2021-01-29 RX ADMIN — MORPHINE SULFATE 200 MCG: 1 INJECTION, SOLUTION EPIDURAL; INTRATHECAL; INTRAVENOUS at 10:40

## 2021-01-29 RX ADMIN — ONDANSETRON HYDROCHLORIDE 4 MG: 2 SOLUTION INTRAMUSCULAR; INTRAVENOUS at 10:54

## 2021-01-29 RX ADMIN — FAMOTIDINE 20 MG: 10 INJECTION INTRAVENOUS at 10:11

## 2021-01-29 RX ADMIN — SODIUM CHLORIDE, POTASSIUM CHLORIDE, SODIUM LACTATE AND CALCIUM CHLORIDE 125 ML/HR: 600; 310; 30; 20 INJECTION, SOLUTION INTRAVENOUS at 09:12

## 2021-01-29 RX ADMIN — OXYTOCIN 999 ML/HR: 10 INJECTION INTRAVENOUS at 10:59

## 2021-01-29 RX ADMIN — FENTANYL CITRATE 20 MCG: 50 INJECTION INTRAMUSCULAR; INTRAVENOUS at 10:40

## 2021-01-29 RX ADMIN — BUPIVACAINE HYDROCHLORIDE 1.8 ML: 7.5 INJECTION, SOLUTION EPIDURAL; RETROBULBAR at 10:40

## 2021-01-29 RX ADMIN — EPHEDRINE SULFATE 10 MG: 50 INJECTION INTRAVENOUS at 10:55

## 2021-01-29 RX ADMIN — ONDANSETRON 4 MG: 2 INJECTION INTRAMUSCULAR; INTRAVENOUS at 22:13

## 2021-01-29 RX ADMIN — OXYTOCIN 125 ML/HR: 10 INJECTION INTRAVENOUS at 12:20

## 2021-01-29 RX ADMIN — ONDANSETRON 4 MG: 2 INJECTION INTRAMUSCULAR; INTRAVENOUS at 16:36

## 2021-01-29 RX ADMIN — PHENYLEPHRINE HYDROCHLORIDE 200 MCG: 10 INJECTION INTRAVENOUS at 10:54

## 2021-01-29 RX ADMIN — OXYCODONE HYDROCHLORIDE AND ACETAMINOPHEN 1 TABLET: 5; 325 TABLET ORAL at 21:19

## 2021-01-29 RX ADMIN — IBUPROFEN 600 MG: 600 TABLET ORAL at 22:13

## 2021-01-29 RX ADMIN — EPHEDRINE SULFATE 10 MG: 50 INJECTION INTRAVENOUS at 10:40

## 2021-01-29 RX ADMIN — Medication: at 14:11

## 2021-01-29 RX ADMIN — ACETAMINOPHEN 1000 MG: 500 TABLET, FILM COATED ORAL at 10:10

## 2021-01-29 RX ADMIN — IBUPROFEN 600 MG: 600 TABLET ORAL at 14:11

## 2021-01-29 NOTE — ANESTHESIA PROCEDURE NOTES
Spinal Block      Patient location during procedure: OB  Indication:procedure for pain  Performed By  Anesthesiologist: Brady Harper MD  Preanesthetic Checklist  Completed: patient identified, site marked, surgical consent, pre-op evaluation, timeout performed, IV checked, risks and benefits discussed and monitors and equipment checked  Spinal Block Prep:  Patient Position:sitting  Sterile Tech:cap, gloves and mask  Prep:Chloraprep  Patient Monitoring:blood pressure monitoring, continuous pulse oximetry and EKG  Spinal Block Procedure  Approach:midline  Guidance:palpation technique  Location:L4-L5  Needle Type:Bela  Needle Gauge:25 G  Placement of Spinal needle event:cerebrospinal fluid aspirated  Medications: fentaNYL citrate (PF) (SUBLIMAZE) injection, 20 mcg  Morphine PF injection, 200 mcg  bupivacaine PF (MARCAINE) 0.75 % injection, 1.8 mL  Med Administered at 1/29/2021 10:40 AM   Post Assessment  Patient Tolerance:patient tolerated the procedure well with no apparent complications  Complications no

## 2021-01-29 NOTE — ANESTHESIA PREPROCEDURE EVALUATION
Anesthesia Evaluation     Patient summary reviewed   history of anesthetic complications (hx nausea post SAB):               Airway   Mallampati: II  No difficulty expected  Dental      Pulmonary    Cardiovascular     Rhythm: regular        Neuro/Psych  GI/Hepatic/Renal/Endo      Musculoskeletal     Abdominal    Substance History      OB/GYN          Other                        Anesthesia Plan    ASA 2     spinal       Anesthetic plan, all risks, benefits, and alternatives have been provided, discussed and informed consent has been obtained with: patient.

## 2021-01-29 NOTE — ANESTHESIA POSTPROCEDURE EVALUATION
Patient: Joanna Niño    Procedure Summary     Date: 21 Room / Location:  [facility]    Anesthesia Start: 1035 Anesthesia Stop:     Procedure:  SECTION REPEAT (N/A Abdomen) Diagnosis:     Surgeon: Maddi Rhoades MD Provider: Brady Harper MD    Anesthesia Type: spinal ASA Status: 2          Anesthesia Type: spinal    Vitals  Vitals Value Taken Time   /90 21 1319   Temp 36.6 °C (97.8 °F) 21 1134   Pulse 91 21 1319   Resp 16 21 1304   SpO2 98 % 21 1319   Vitals shown include unvalidated device data.        Post Anesthesia Care and Evaluation      Comments: Pt. Discharged prior to being evaluated by anesthesia.  Chart is reviewed and no complications are noted.  THIS CASE IS NOT MEDICALLY DIRECTED

## 2021-01-30 LAB
BASOPHILS # BLD AUTO: 0.05 10*3/MM3 (ref 0–0.2)
BASOPHILS NFR BLD AUTO: 0.5 % (ref 0–1.5)
DEPRECATED RDW RBC AUTO: 37 FL (ref 37–54)
EOSINOPHIL # BLD AUTO: 0.05 10*3/MM3 (ref 0–0.4)
EOSINOPHIL NFR BLD AUTO: 0.5 % (ref 0.3–6.2)
ERYTHROCYTE [DISTWIDTH] IN BLOOD BY AUTOMATED COUNT: 15.2 % (ref 12.3–15.4)
HCT VFR BLD AUTO: 28.5 % (ref 34–46.6)
HGB BLD-MCNC: 8.7 G/DL (ref 12–15.9)
LYMPHOCYTES # BLD AUTO: 1.23 10*3/MM3 (ref 0.7–3.1)
LYMPHOCYTES NFR BLD AUTO: 12.9 % (ref 19.6–45.3)
MCH RBC QN AUTO: 20.9 PG (ref 26.6–33)
MCHC RBC AUTO-ENTMCNC: 30.5 G/DL (ref 31.5–35.7)
MCV RBC AUTO: 68.3 FL (ref 79–97)
MONOCYTES # BLD AUTO: 0.59 10*3/MM3 (ref 0.1–0.9)
MONOCYTES NFR BLD AUTO: 6.2 % (ref 5–12)
NEUTROPHILS NFR BLD AUTO: 7.53 10*3/MM3 (ref 1.7–7)
NEUTROPHILS NFR BLD AUTO: 79.2 % (ref 42.7–76)
PLATELET # BLD AUTO: 198 10*3/MM3 (ref 140–450)
PMV BLD AUTO: 12.4 FL (ref 6–12)
RBC # BLD AUTO: 4.17 10*6/MM3 (ref 3.77–5.28)
WBC # BLD AUTO: 9.52 10*3/MM3 (ref 3.4–10.8)

## 2021-01-30 PROCEDURE — 85025 COMPLETE CBC W/AUTO DIFF WBC: CPT | Performed by: OBSTETRICS & GYNECOLOGY

## 2021-01-30 PROCEDURE — 63710000001 ONDANSETRON PER 8 MG: Performed by: OBSTETRICS & GYNECOLOGY

## 2021-01-30 PROCEDURE — 25010000002 ONDANSETRON PER 1 MG: Performed by: OBSTETRICS & GYNECOLOGY

## 2021-01-30 RX ADMIN — ONDANSETRON 4 MG: 2 INJECTION INTRAMUSCULAR; INTRAVENOUS at 04:12

## 2021-01-30 RX ADMIN — IBUPROFEN 600 MG: 600 TABLET ORAL at 15:54

## 2021-01-30 RX ADMIN — OXYCODONE HYDROCHLORIDE AND ACETAMINOPHEN 1 TABLET: 10; 325 TABLET ORAL at 04:12

## 2021-01-30 RX ADMIN — ONDANSETRON HYDROCHLORIDE 4 MG: 4 TABLET, FILM COATED ORAL at 11:11

## 2021-01-30 RX ADMIN — OXYCODONE HYDROCHLORIDE AND ACETAMINOPHEN 1 TABLET: 10; 325 TABLET ORAL at 15:54

## 2021-01-30 RX ADMIN — OXYCODONE HYDROCHLORIDE AND ACETAMINOPHEN 1 TABLET: 10; 325 TABLET ORAL at 07:48

## 2021-01-30 RX ADMIN — DOCUSATE SODIUM 100 MG: 100 CAPSULE, LIQUID FILLED ORAL at 11:11

## 2021-01-30 RX ADMIN — OXYCODONE HYDROCHLORIDE AND ACETAMINOPHEN 1 TABLET: 10; 325 TABLET ORAL at 11:51

## 2021-01-30 RX ADMIN — Medication 1 TABLET: at 11:11

## 2021-01-30 RX ADMIN — DOCUSATE SODIUM 100 MG: 100 CAPSULE, LIQUID FILLED ORAL at 20:28

## 2021-01-30 RX ADMIN — OXYCODONE HYDROCHLORIDE AND ACETAMINOPHEN 1 TABLET: 10; 325 TABLET ORAL at 20:27

## 2021-01-30 RX ADMIN — IBUPROFEN 600 MG: 600 TABLET ORAL at 07:48

## 2021-01-31 LAB
DEPRECATED RDW RBC AUTO: 36.2 FL (ref 37–54)
ERYTHROCYTE [DISTWIDTH] IN BLOOD BY AUTOMATED COUNT: 15.1 % (ref 12.3–15.4)
HCT VFR BLD AUTO: 29.6 % (ref 34–46.6)
HGB BLD-MCNC: 9.4 G/DL (ref 12–15.9)
MCH RBC QN AUTO: 21.6 PG (ref 26.6–33)
MCHC RBC AUTO-ENTMCNC: 31.8 G/DL (ref 31.5–35.7)
MCV RBC AUTO: 68 FL (ref 79–97)
PLATELET # BLD AUTO: 221 10*3/MM3 (ref 140–450)
PMV BLD AUTO: 11.8 FL (ref 6–12)
RBC # BLD AUTO: 4.35 10*6/MM3 (ref 3.77–5.28)
WBC # BLD AUTO: 10.4 10*3/MM3 (ref 3.4–10.8)

## 2021-01-31 PROCEDURE — 85027 COMPLETE CBC AUTOMATED: CPT | Performed by: NURSE PRACTITIONER

## 2021-01-31 RX ORDER — IBUPROFEN 600 MG/1
600 TABLET ORAL EVERY 8 HOURS PRN
Qty: 30 TABLET | Refills: 0 | Status: SHIPPED | OUTPATIENT
Start: 2021-01-31 | End: 2021-02-01

## 2021-01-31 RX ORDER — OXYCODONE HYDROCHLORIDE AND ACETAMINOPHEN 5; 325 MG/1; MG/1
1 TABLET ORAL EVERY 4 HOURS PRN
Qty: 30 TABLET | Refills: 0 | Status: SHIPPED | OUTPATIENT
Start: 2021-01-31 | End: 2021-02-01

## 2021-01-31 RX ADMIN — DOCUSATE SODIUM 100 MG: 100 CAPSULE, LIQUID FILLED ORAL at 21:12

## 2021-01-31 RX ADMIN — IBUPROFEN 600 MG: 600 TABLET ORAL at 00:13

## 2021-01-31 RX ADMIN — OXYCODONE HYDROCHLORIDE AND ACETAMINOPHEN 1 TABLET: 10; 325 TABLET ORAL at 08:21

## 2021-01-31 RX ADMIN — OXYCODONE HYDROCHLORIDE AND ACETAMINOPHEN 1 TABLET: 10; 325 TABLET ORAL at 00:13

## 2021-01-31 RX ADMIN — Medication 1 TABLET: at 08:21

## 2021-01-31 RX ADMIN — OXYCODONE HYDROCHLORIDE AND ACETAMINOPHEN 1 TABLET: 10; 325 TABLET ORAL at 12:18

## 2021-01-31 RX ADMIN — OXYCODONE HYDROCHLORIDE AND ACETAMINOPHEN 1 TABLET: 10; 325 TABLET ORAL at 16:13

## 2021-01-31 RX ADMIN — OXYCODONE HYDROCHLORIDE AND ACETAMINOPHEN 1 TABLET: 10; 325 TABLET ORAL at 21:13

## 2021-01-31 RX ADMIN — IBUPROFEN 600 MG: 600 TABLET ORAL at 08:21

## 2021-01-31 RX ADMIN — OXYCODONE HYDROCHLORIDE AND ACETAMINOPHEN 1 TABLET: 10; 325 TABLET ORAL at 04:37

## 2021-01-31 RX ADMIN — IBUPROFEN 600 MG: 600 TABLET ORAL at 16:13

## 2021-02-01 VITALS
SYSTOLIC BLOOD PRESSURE: 132 MMHG | BODY MASS INDEX: 34.66 KG/M2 | DIASTOLIC BLOOD PRESSURE: 87 MMHG | RESPIRATION RATE: 18 BRPM | HEIGHT: 64 IN | OXYGEN SATURATION: 98 % | TEMPERATURE: 98.1 F | HEART RATE: 96 BPM | WEIGHT: 203 LBS

## 2021-02-01 PROBLEM — Z34.90 PREGNANCY: Status: RESOLVED | Noted: 2021-01-29 | Resolved: 2021-02-01

## 2021-02-01 RX ORDER — OXYCODONE HYDROCHLORIDE AND ACETAMINOPHEN 5; 325 MG/1; MG/1
1 TABLET ORAL EVERY 4 HOURS PRN
Qty: 30 TABLET | Refills: 0 | Status: SHIPPED | OUTPATIENT
Start: 2021-02-01 | End: 2021-02-23

## 2021-02-01 RX ORDER — IBUPROFEN 800 MG/1
800 TABLET ORAL EVERY 6 HOURS PRN
Qty: 50 TABLET | Refills: 3 | Status: SHIPPED | OUTPATIENT
Start: 2021-02-01 | End: 2021-02-23

## 2021-02-01 RX ORDER — PSEUDOEPHEDRINE HCL 30 MG
100 TABLET ORAL 2 TIMES DAILY PRN
Qty: 60 CAPSULE | Refills: 0 | Status: SHIPPED | OUTPATIENT
Start: 2021-02-01 | End: 2021-02-23

## 2021-02-01 RX ORDER — OXYCODONE HYDROCHLORIDE AND ACETAMINOPHEN 5; 325 MG/1; MG/1
1 TABLET ORAL EVERY 4 HOURS PRN
Qty: 30 TABLET | Refills: 0 | Status: SHIPPED | OUTPATIENT
Start: 2021-02-01 | End: 2021-02-06

## 2021-02-01 RX ORDER — DOCUSATE SODIUM 100 MG/1
100 CAPSULE, LIQUID FILLED ORAL 2 TIMES DAILY
Qty: 60 CAPSULE | Refills: 1 | Status: SHIPPED | OUTPATIENT
Start: 2021-02-01 | End: 2021-02-23

## 2021-02-01 RX ORDER — IBUPROFEN 600 MG/1
600 TABLET ORAL EVERY 8 HOURS PRN
Qty: 30 TABLET | Refills: 0 | Status: SHIPPED | OUTPATIENT
Start: 2021-02-01

## 2021-02-01 RX ADMIN — IBUPROFEN 600 MG: 600 TABLET ORAL at 08:55

## 2021-02-01 RX ADMIN — OXYCODONE HYDROCHLORIDE AND ACETAMINOPHEN 1 TABLET: 5; 325 TABLET ORAL at 08:55

## 2021-02-01 RX ADMIN — OXYCODONE HYDROCHLORIDE AND ACETAMINOPHEN 1 TABLET: 10; 325 TABLET ORAL at 01:05

## 2021-02-01 RX ADMIN — Medication 1 TABLET: at 08:55

## 2021-02-01 RX ADMIN — OXYCODONE HYDROCHLORIDE AND ACETAMINOPHEN 1 TABLET: 10; 325 TABLET ORAL at 12:55

## 2021-02-01 RX ADMIN — DOCUSATE SODIUM 100 MG: 100 CAPSULE, LIQUID FILLED ORAL at 08:55

## 2021-02-01 RX ADMIN — IBUPROFEN 600 MG: 600 TABLET ORAL at 01:05

## 2021-02-01 NOTE — LACTATION NOTE
"This note was copied from a baby's chart.  P2 term baby nursing and Mom is pumping, giving EBM. She reports she mostly pumped with her first baby for 9 months and this is her plan with current baby as well. She describes \" a little hard\" breast. Discussed engorgement management which she reports she is aware of and experienced with her first baby. They are hoping for D/C today. Encouraged to call for any assistance or questions.  "

## 2021-02-01 NOTE — DISCHARGE SUMMARY
Date of Discharge:  2021    Discharge Diagnosis: term pregnancy, delivered by section    Presenting Problem/History of Present Illness  Pregnancy [Z34.90]       Hospital Course  Patient is a 35 y.o. female presented for RLTCS.  Delivered and recovered well.      Procedures Performed  Procedure(s):   SECTION REPEAT         Condition on Discharge:  Post-Op Day 3 S/P   Subjective     Patient reports:    Doing well - ready for discharge. Pain well controlled. Tolerating po and having flatus. Voiding and ambulating without difficulty. Lochia normal.     Vital Signs  Temp:  [98 °F (36.7 °C)-98.2 °F (36.8 °C)] 98.2 °F (36.8 °C)  Heart Rate:  [81-92] 92  Resp:  [16-18] 16  BP: (109-110)/(72) 110/72    Physical Exam    Gen Alert and awake   Abdomen Soft, ND,  Fundus firm with minimal tenderness   Incision  Intact, without erythema or exudate   Extremities Calves NT bilaterally     Assessment/Plan ]   Assessment:  POD 3  -  Doing well. Stable for discharge.     Plan:    Instructions reviewed       Consults:   Consults     No orders found from 2020 to 2021.          Discharge Disposition  Home or Self Care    Discharge Medications     Discharge Medications      New Medications      Instructions Start Date   docusate sodium 100 MG capsule  Commonly known as: Colace   100 mg, Oral, 2 Times Daily      docusate sodium 100 MG capsule   100 mg, Oral, 2 Times Daily PRN      ibuprofen 600 MG tablet  Commonly known as: ADVIL,MOTRIN   600 mg, Oral, Every 8 Hours PRN      oxyCODONE-acetaminophen 5-325 MG per tablet  Commonly known as: PERCOCET   1 tablet, Oral, Every 4 Hours PRN         Stop These Medications    acetaminophen 500 MG tablet  Commonly known as: TYLENOL     Magnesium 125 MG capsule     prenatal (CLASSIC) vitamin  tablet  Generic drug: prenatal vitamin            The patient has been prescribed a controlled substance.  She has been counseled on the risks associated with using the medication.    The addictive potential of this medication and alternatives were discussed carefully with this patient and she demonstrated understanding.  A YOLANDA report has been obtained and reviewed.    Activity at Discharge: restrictions reviewed    Follow-up Appointments  Future Appointments   Date Time Provider Department Center   2/23/2021  1:40 PM LAB CHAIR [facility]   2/23/2021  2:20 PM Ernesto Wu MD PhD [facility]         Test Results Pending at Discharge  none     Ericka Sheppard MD  02/01/21  08:15 EST

## 2021-02-01 NOTE — PLAN OF CARE
Problem: Adult Inpatient Plan of Care  Goal: Plan of Care Review  Outcome: Ongoing, Progressing  Flowsheets (Taken 2/1/2021 5605)  Progress: improving  Plan of Care Reviewed With: patient   Goal Outcome Evaluation:  Plan of Care Reviewed With: patient  Progress: improving

## 2021-02-23 ENCOUNTER — LAB (OUTPATIENT)
Dept: LAB | Facility: HOSPITAL | Age: 36
End: 2021-02-23

## 2021-02-23 ENCOUNTER — OFFICE VISIT (OUTPATIENT)
Dept: ONCOLOGY | Facility: CLINIC | Age: 36
End: 2021-02-23

## 2021-02-23 VITALS
OXYGEN SATURATION: 98 % | RESPIRATION RATE: 16 BRPM | HEART RATE: 86 BPM | SYSTOLIC BLOOD PRESSURE: 117 MMHG | BODY MASS INDEX: 31.45 KG/M2 | DIASTOLIC BLOOD PRESSURE: 82 MMHG | WEIGHT: 177.5 LBS | HEIGHT: 63 IN | TEMPERATURE: 97.7 F

## 2021-02-23 DIAGNOSIS — O99.013 ANEMIA AFFECTING PREGNANCY IN THIRD TRIMESTER: ICD-10-CM

## 2021-02-23 DIAGNOSIS — D56.3 THALASSEMIA TRAIT, BETA: Primary | ICD-10-CM

## 2021-02-23 LAB
BASOPHILS # BLD AUTO: 0.06 10*3/MM3 (ref 0–0.2)
BASOPHILS NFR BLD AUTO: 0.5 % (ref 0–1.5)
DEPRECATED RDW RBC AUTO: 33.2 FL (ref 37–54)
EOSINOPHIL # BLD AUTO: 0.25 10*3/MM3 (ref 0–0.4)
EOSINOPHIL NFR BLD AUTO: 2.3 % (ref 0.3–6.2)
ERYTHROCYTE [DISTWIDTH] IN BLOOD BY AUTOMATED COUNT: 13.6 % (ref 12.3–15.4)
FERRITIN SERPL-MCNC: 90.9 NG/ML (ref 11–207)
HCT VFR BLD AUTO: 39.6 % (ref 34–46.6)
HGB BLD-MCNC: 11.7 G/DL (ref 12–15.9)
HGB RETIC QN AUTO: 21.7 PG (ref 29.8–36.1)
IMM GRANULOCYTES # BLD AUTO: 0.05 10*3/MM3 (ref 0–0.05)
IMM GRANULOCYTES NFR BLD AUTO: 0.5 % (ref 0–0.5)
IMM RETICS NFR: 12.9 % (ref 3–15.8)
IRON 24H UR-MRATE: 66 MCG/DL (ref 37–145)
IRON SATN MFR SERPL: 21 % (ref 14–48)
LYMPHOCYTES # BLD AUTO: 1.95 10*3/MM3 (ref 0.7–3.1)
LYMPHOCYTES NFR BLD AUTO: 17.6 % (ref 19.6–45.3)
MCH RBC QN AUTO: 20.5 PG (ref 26.6–33)
MCHC RBC AUTO-ENTMCNC: 29.5 G/DL (ref 31.5–35.7)
MCV RBC AUTO: 69.2 FL (ref 79–97)
MONOCYTES # BLD AUTO: 0.69 10*3/MM3 (ref 0.1–0.9)
MONOCYTES NFR BLD AUTO: 6.2 % (ref 5–12)
NEUTROPHILS NFR BLD AUTO: 72.9 % (ref 42.7–76)
NEUTROPHILS NFR BLD AUTO: 8.05 10*3/MM3 (ref 1.7–7)
NRBC BLD AUTO-RTO: 0 /100 WBC (ref 0–0.2)
PLATELET # BLD AUTO: 402 10*3/MM3 (ref 140–450)
PMV BLD AUTO: 10 FL (ref 6–12)
RBC # BLD AUTO: 5.72 10*6/MM3 (ref 3.77–5.28)
RETICS # AUTO: 0.11 10*6/MM3 (ref 0.02–0.13)
RETICS/RBC NFR AUTO: 1.95 % (ref 0.7–1.9)
TIBC SERPL-MCNC: 319 MCG/DL (ref 249–505)
TRANSFERRIN SERPL-MCNC: 228 MG/DL (ref 200–360)
WBC # BLD AUTO: 11.05 10*3/MM3 (ref 3.4–10.8)

## 2021-02-23 PROCEDURE — 84466 ASSAY OF TRANSFERRIN: CPT

## 2021-02-23 PROCEDURE — 99213 OFFICE O/P EST LOW 20 MIN: CPT | Performed by: INTERNAL MEDICINE

## 2021-02-23 PROCEDURE — 36415 COLL VENOUS BLD VENIPUNCTURE: CPT

## 2021-02-23 PROCEDURE — 83540 ASSAY OF IRON: CPT

## 2021-02-23 PROCEDURE — 82728 ASSAY OF FERRITIN: CPT

## 2021-02-23 PROCEDURE — 85025 COMPLETE CBC W/AUTO DIFF WBC: CPT

## 2021-02-23 PROCEDURE — 85046 RETICYTE/HGB CONCENTRATE: CPT

## 2021-02-23 RX ORDER — FLUCONAZOLE 200 MG/1
200 TABLET ORAL DAILY
COMMUNITY
End: 2022-04-11

## 2021-02-23 NOTE — PROGRESS NOTES
.     REASON FOR FOLLOWUP:     *  Provide an opinion on any further workup or treatment of anemia during third trimester pregnancy, with background of beta thalassemia minor.   · Lab study on 2020 showed no evidence for iron deficiency.  Excellent folate level and a low normal vitamin B12 level.  · Patient had a successful  on 2021.  Postop hemoglobin everett 8.7.                                  HISTORY OF PRESENT ILLNESS:  The patient is a 35 y.o. year old female with a history of irritable bowel syndrome, migraine headache and a beta thalassemia minor who presents today for evaluation of anemia.  Patient had a successful  with a healthy baby boy on 2021.     Patient reports she continues taking prenatal vitamins.  She has recovered from childbirth.  No specific complaints.    Laboratory study today reported much improved hemoglobin 11.7, MCV 69.2 MCHC 29.5.  He has platelets 402,000 and WBC 11,050 with ANC 8050, lymphocytes 1950 monocytes 690.  Iron studies are pending.    She reports no fever sweating chills.      Past Medical History:   Diagnosis Date   • Anemia    • Anxiety    • Beta minor thalassemia    • Chronic shoulder pain    • IBS (irritable bowel syndrome)    • Migraine      Past Surgical History:   Procedure Laterality Date   •  SECTION N/A 2019    Procedure:  SECTION PRIMARY;  Surgeon: Maddi Rhoades MD;  Location: [facility] LABOR DELIVERY;  Service: Obstetrics/Gynecology   •  SECTION N/A 2021    Procedure:  SECTION REPEAT;  Surgeon: Maddi Rhoades MD;  Location: [facility] LABOR DELIVERY;  Service: Obstetrics/Gynecology;  Laterality: N/A;   • WISDOM TOOTH EXTRACTION       HEMATOLOGY HISTORY: The patient is a 35 y.o. year old female with a history of irritable bowel syndrome, migraine headache and anxiety, who is currently 33 weeks pregnant, here on 2020 for initial evaluation because of anemia.  Patient reports this is  her second pregnancy, and she has a history of beta thalassemia minor.      Patient reports she takes prenatal vitamins once a day.  She does not taking separate oral iron.  Previously was not able to tolerate oral iron because of constipation.  She has intermittent headaches and increased urination because of pregnancy.  She reports no melena hematochezia.     Patient reports she has history of beta thalassemia minor.    I reviewed outside laboratory studies from 7/1/2020.  Laboratory study 7/1/2020 reported MCV 68.3, MCHC 29.7, platelets 351,000, in the ANC 7030, lymphocytes 1600.  Her hemoglobin was 11.3.  Platelets 351,000, and negative for hepatitis B surface antigen and hepatitis C antibody.    Recent outside laboratory study on 11/12/2020 reported hemoglobin 9.9, MCV 68.2, MCHC 31.1, platelets 245,000 and a WBC 8500, including ANC 6950 lymphocytes 1200 and monocytes 280.     Laboratory study on 12/23/2020 reported mild anemia Hb 11.0, microcytosis MCV 67.6, normal MCHC 32.2.  Normal platelets 247,000.  She has elevated WBC 12,620 including ANC 10,330 mm lymphocytes 1616.  Iron study reported ferritin 98.2 ng/mL, free iron 128 TIBC 461 and iron saturation 28%, supratherapeutic folate more than 20 ng/mL and low normal B12 level 418 pg/mL.     No need for IV iron therapy.     MEDICATIONS    Current Outpatient Medications:   •  fluconazole (DIFLUCAN) 200 MG tablet, Take 200 mg by mouth Daily., Disp: , Rfl:   •  ibuprofen (ADVIL,MOTRIN) 600 MG tablet, Take 1 tablet by mouth Every 8 (Eight) Hours As Needed for Mild Pain ., Disp: 30 tablet, Rfl: 0  •  MAGNESIUM PO, Take  by mouth., Disp: , Rfl:   •  Prenatal Vit-Fe Fumarate-FA (PRENATAL ONE DAILY PO), Take  by mouth., Disp: , Rfl:     ALLERGIES:     Allergies   Allergen Reactions   • Shellfish-Derived Products GI Intolerance       SOCIAL HISTORY:       Social History     Socioeconomic History   • Marital status:      Spouse name: Suleiman    • Number of children:  "1   • Years of education: College   • Highest education level: Not on file   Occupational History   • Occupation: Nurse practitioner     Employer: [employer]   Tobacco Use   • Smoking status: Never Smoker   • Smokeless tobacco: Never Used   Substance and Sexual Activity   • Alcohol use: No   • Drug use: No   • Sexual activity: Yes     Partners: Male         FAMILY HISTORY:  Mother, sister and aunts has chronic anemia.  Grandmother and grandfather had cancer no details available.  Grandmother also had hypertension no details.     REVIEW OF SYSTEMS:  Review of Systems   Constitutional: Positive for fatigue. Negative for activity change, appetite change, diaphoresis and fever.   HENT: Negative for mouth sores, nosebleeds and sore throat.    Eyes: Negative for photophobia and visual disturbance.   Respiratory: Negative for cough and shortness of breath.    Cardiovascular: Negative for chest pain, palpitations and leg swelling.   Gastrointestinal: Positive for diarrhea (Irritable bowel syndrome). Negative for abdominal pain and blood in stool.   Endocrine: Negative for cold intolerance and heat intolerance.   Genitourinary: Negative for dysuria, frequency and hematuria.   Musculoskeletal: Negative for arthralgias and gait problem.   Skin: Negative for color change and pallor.   Allergic/Immunologic: Negative for environmental allergies and immunocompromised state.   Neurological: Negative for dizziness and headaches (Migraine headaches, intermittent headaches.).   Hematological: Negative for adenopathy. Does not bruise/bleed easily.   Psychiatric/Behavioral: Negative for agitation. The patient is not nervous/anxious.               Vitals:    02/23/21 1447   BP: 117/82   Pulse: 86   Resp: 16   Temp: 97.7 °F (36.5 °C)   SpO2: 98%   Weight: 80.5 kg (177 lb 8 oz)   Height: 161 cm (63.39\")   PainSc: 0-No pain     Current Status 2/23/2021   ECOG score 0      PHYSICAL EXAM:     GENERAL:  Well-developed, " well-nourished in no acute distress.   SKIN:  Warm, dry without rashes, purpura or petechiae.  HEAD:  Normocephalic.  EYES:  Pupils equal, round.  Conjunctivae normal.  NOSE:  Patient wears mask due to the pandemic coronavirus infection.   NECK:  Supple; no thyromegaly or masses.  LYMPHATICS:  No cervical, supraclavicular adenopathy.  CHEST: Normal respiratory effort.  Lungs clear to auscultation. Good airflow.  CARDIAC:  Regular rate and rhythm, murmurs. Normal S1,S2.  ABDOMEN:  Soft, nontender with no organomegaly or masses.  Bowel sounds normal.  EXTREMITIES:  No clubbing, cyanosis or edema.  NEUROLOGICAL:  Cranial Nerves II-XII grossly intact.  No focal neurological deficits.  PSYCHIATRIC:  Normal affect and mood.        RECENT LABS:    WBC   Date Value Ref Range Status   02/23/2021 11.05 (H) 3.40 - 10.80 10*3/mm3 Final     RBC   Date Value Ref Range Status   02/23/2021 5.72 (H) 3.77 - 5.28 10*6/mm3 Final     Hemoglobin   Date Value Ref Range Status   02/23/2021 11.7 (L) 12.0 - 15.9 g/dL Final     Hematocrit   Date Value Ref Range Status   02/23/2021 39.6 34.0 - 46.6 % Final     MCV   Date Value Ref Range Status   02/23/2021 69.2 (L) 79.0 - 97.0 fL Final     MCH   Date Value Ref Range Status   02/23/2021 20.5 (L) 26.6 - 33.0 pg Final     MCHC   Date Value Ref Range Status   02/23/2021 29.5 (L) 31.5 - 35.7 g/dL Final     RDW   Date Value Ref Range Status   02/23/2021 13.6 12.3 - 15.4 % Final     RDW-SD   Date Value Ref Range Status   02/23/2021 33.2 (L) 37.0 - 54.0 fl Final     MPV   Date Value Ref Range Status   02/23/2021 10.0 6.0 - 12.0 fL Final     Platelets   Date Value Ref Range Status   02/23/2021 402 140 - 450 10*3/mm3 Final     Neutrophil %   Date Value Ref Range Status   02/23/2021 72.9 42.7 - 76.0 % Final     Lymphocyte %   Date Value Ref Range Status   02/23/2021 17.6 (L) 19.6 - 45.3 % Final     Monocyte %   Date Value Ref Range Status   02/23/2021 6.2 5.0 - 12.0 % Final     Eosinophil %   Date Value  Ref Range Status   2021 2.3 0.3 - 6.2 % Final     Basophil %   Date Value Ref Range Status   2021 0.5 0.0 - 1.5 % Final     Immature Grans %   Date Value Ref Range Status   2021 0.5 0.0 - 0.5 % Final     Neutrophils, Absolute   Date Value Ref Range Status   2021 8.05 (H) 1.70 - 7.00 10*3/mm3 Final     Lymphocytes, Absolute   Date Value Ref Range Status   2021 1.95 0.70 - 3.10 10*3/mm3 Final     Monocytes, Absolute   Date Value Ref Range Status   2021 0.69 0.10 - 0.90 10*3/mm3 Final     Eosinophils, Absolute   Date Value Ref Range Status   2021 0.25 0.00 - 0.40 10*3/mm3 Final     Basophils, Absolute   Date Value Ref Range Status   2021 0.06 0.00 - 0.20 10*3/mm3 Final     Immature Grans, Absolute   Date Value Ref Range Status   2021 0.05 0.00 - 0.05 10*3/mm3 Final     nRBC   Date Value Ref Range Status   2021 0.0 0.0 - 0.2 /100 WBC Final     Lab Results   Component Value Date    IRON 128 2020    TIBC 461 2020    FERRITIN 98.20 2020   Iron saturation 28% on 2020.       folate > 20 ng/mL and B12 at 418 pg on 2020.      Assessment/Plan   *  Anemia with background of beta thalassemia minor.   · Patient had a worsening anemia during his early trimester of pregnancy.  Laboratory study on 2020 reported ferritin 98 and iron saturation 28%.  She was not having iron deficiency, at the least was not severe enough to receive intravenous iron therapy.  She was not able to tolerate oral iron treatment due to constipation.  She does take prenatal vitamins which contain small quantity of iron.   · Patient had  on 2020, with postop everett hemoglobin 8.7 on 2021.  · Today on 2021 patient has much improved hemoglobin 11.7.  Iron studies pending.      Plan:    1. Pending iron studies today  - Ferritin  - Iron Profile  2.  Continue prenatal vitamin daily.  3.  No need for patient to follow-up with us routinely.  If she  becomes pregnant again, would be happy to see her.    NOAH BRISCOE M.D., Ph.D.      2/23/2021.        CC:  Maddi Rhoades MD

## 2021-02-24 ENCOUNTER — TELEPHONE (OUTPATIENT)
Dept: ONCOLOGY | Facility: CLINIC | Age: 36
End: 2021-02-24

## 2021-02-24 NOTE — TELEPHONE ENCOUNTER
Spoke with patient this am to let her know that her iron studies were great and to continue her prenatal vitamins. She v/u. No further questions.

## 2021-04-16 ENCOUNTER — BULK ORDERING (OUTPATIENT)
Dept: CASE MANAGEMENT | Facility: OTHER | Age: 36
End: 2021-04-16

## 2021-04-16 DIAGNOSIS — Z23 IMMUNIZATION DUE: ICD-10-CM

## 2022-04-11 ENCOUNTER — OFFICE VISIT (OUTPATIENT)
Dept: GASTROENTEROLOGY | Facility: CLINIC | Age: 37
End: 2022-04-11

## 2022-04-11 ENCOUNTER — TELEPHONE (OUTPATIENT)
Dept: GASTROENTEROLOGY | Facility: CLINIC | Age: 37
End: 2022-04-11

## 2022-04-11 VITALS
DIASTOLIC BLOOD PRESSURE: 75 MMHG | SYSTOLIC BLOOD PRESSURE: 120 MMHG | WEIGHT: 176.5 LBS | HEIGHT: 64 IN | BODY MASS INDEX: 30.13 KG/M2

## 2022-04-11 DIAGNOSIS — R10.9 ABDOMINAL CRAMPING: Chronic | ICD-10-CM

## 2022-04-11 DIAGNOSIS — K52.9 CHRONIC DIARRHEA: Primary | Chronic | ICD-10-CM

## 2022-04-11 PROCEDURE — 99204 OFFICE O/P NEW MOD 45 MIN: CPT | Performed by: INTERNAL MEDICINE

## 2022-04-11 RX ORDER — SUMATRIPTAN 50 MG/1
50 TABLET, FILM COATED ORAL
COMMUNITY

## 2022-04-11 RX ORDER — SODIUM CHLORIDE, SODIUM LACTATE, POTASSIUM CHLORIDE, CALCIUM CHLORIDE 600; 310; 30; 20 MG/100ML; MG/100ML; MG/100ML; MG/100ML
30 INJECTION, SOLUTION INTRAVENOUS CONTINUOUS
Status: CANCELLED | OUTPATIENT
Start: 2022-06-17

## 2022-04-11 RX ORDER — SUMATRIPTAN 100 MG/1
100 TABLET, FILM COATED ORAL
COMMUNITY

## 2022-04-11 RX ORDER — NORETHINDRONE ACETATE AND ETHINYL ESTRADIOL AND FERROUS FUMARATE 1.5-30(21)
KIT ORAL
COMMUNITY
Start: 2022-01-12

## 2022-04-11 RX ORDER — TOPIRAMATE 50 MG/1
50 TABLET, FILM COATED ORAL DAILY
COMMUNITY
End: 2022-04-11

## 2022-04-11 RX ORDER — TOPIRAMATE 25 MG/1
75 TABLET ORAL DAILY
COMMUNITY

## 2022-04-11 NOTE — TELEPHONE ENCOUNTER
MOHAN patient in office for colonoscopy. Scheduled 06/17/2022 with arrival time 10:30am. Prep packet handed to patient. Also advised arrival time may vary based on [facility] guidelines. MOHAN Plascencia--José Antonio

## 2022-04-11 NOTE — PROGRESS NOTES
Chief Complaint   Patient presents with   • Diarrhea       Subjective     HPI    Joanna Niño is a 36 y.o. female with a past medical history noted below who presents for diarrhea.  She has had issues since childhood.    She describes watery stools, 6-8 times per day.  Rare nocturnal stools, about 5 times per month.  No associated weight loss but she does notice more fatigue with symptoms.  There is associated cramping.  Rare red blood from irritation.  She does take imodium about twice per week, about 4mg.  Usually helps but sometimes does not.      Symptoms worsening and interfering with activities of daily living    Mild chronic anemia, she has a history of thalassemia minor    She reports workup in her middle school years including a colonoscopy and was told that she had IBS.  She has not been on any prescription medication.    She has had 2 c sections.      No family hx of IBD.  Paternal grandmother with CRC, father with history of polyps with frequent colonoscopies.    No smoking, no excessive ETOH    Works as a dermatology NP.      Today's visit was in the office.  Both the patient and I were wearing face masks and proper hand hygiene was performed before and after the physical exam.           Current Outpatient Medications:   •  ibuprofen (ADVIL,MOTRIN) 600 MG tablet, Take 1 tablet by mouth Every 8 (Eight) Hours As Needed for Mild Pain ., Disp: 30 tablet, Rfl: 0  •  Junel FE 1.5/30 1.5-30 MG-MCG tablet, , Disp: , Rfl:   •  SUMAtriptan (IMITREX) 100 MG tablet, Take 100 mg by mouth Every 2 (Two) Hours As Needed for Migraine. Take one tablet at onset of headache. May repeat dose one time in 2 hours if headache not relieved., Disp: , Rfl:   •  SUMAtriptan (IMITREX) 50 MG tablet, Take 50 mg by mouth Every 2 (Two) Hours As Needed for Migraine. Take one tablet at onset of headache. May repeat dose one time in 2 hours if headache not relieved., Disp: , Rfl:   •  topiramate (TOPAMAX) 25 MG tablet, Take 25 mg  by mouth Daily., Disp: , Rfl:   •  hyoscyamine (LEVSIN) 0.125 MG SL tablet, Take 1 tablet by mouth Every 6 (Six) Hours As Needed for Cramping or Diarrhea., Disp: 120 tablet, Rfl: 1  •  Prenatal Vit-Fe Fumarate-FA (PRENATAL ONE DAILY PO), Take  by mouth., Disp: , Rfl:       Objective     Vitals:    04/11/22 0923   BP: 120/75         04/11/22 0923   Weight: 80.1 kg (176 lb 8 oz)     Body mass index is 30.3 kg/m².    Physical Exam  Constitutional:       General: She is not in acute distress.  Pulmonary:      Effort: Pulmonary effort is normal.   Neurological:      Mental Status: She is alert and oriented to person, place, and time.   Psychiatric:         Mood and Affect: Mood normal.         Behavior: Behavior normal.         Thought Content: Thought content normal.         Judgment: Judgment normal.             WBC   Date Value Ref Range Status   03/11/2022 8.96 4.5 - 11.0 10*3/uL Final     RBC   Date Value Ref Range Status   03/11/2022 5.66 (H) 4.0 - 5.2 10*6/uL Final     Hemoglobin   Date Value Ref Range Status   03/11/2022 11.4 (L) 12.0 - 16.0 g/dL Final     Hematocrit   Date Value Ref Range Status   03/11/2022 39.9 36.0 - 46.0 % Final     MCV   Date Value Ref Range Status   03/11/2022 70.5 (L) 80.0 - 100.0 fL Final     MCH   Date Value Ref Range Status   03/11/2022 20.1 (L) 26.0 - 34.0 pg Final     MCHC   Date Value Ref Range Status   03/11/2022 28.6 (L) 31.0 - 37.0 g/dL Final     RDW   Date Value Ref Range Status   03/11/2022 15.9 12.0 - 16.8 % Final     RDW-SD   Date Value Ref Range Status   02/23/2021 33.2 (L) 37.0 - 54.0 fl Final     MPV   Date Value Ref Range Status   03/11/2022 12.1 8.4 - 12.4 fL Final     Platelets   Date Value Ref Range Status   03/11/2022 358 140 - 440 10*3/uL Final     Neutrophil Rel %   Date Value Ref Range Status   03/11/2022 72.9 45 - 80 % Final     Lymphocyte Rel %   Date Value Ref Range Status   03/11/2022 18.0 15 - 50 % Final     Monocyte Rel %   Date Value Ref Range Status    03/11/2022 4.7 0 - 15 % Final     Eosinophil %   Date Value Ref Range Status   03/11/2022 3.3 0 - 7 % Final     Basophil Rel %   Date Value Ref Range Status   03/11/2022 0.8 0 - 2 % Final     Immature Grans %   Date Value Ref Range Status   03/11/2022 0.3 0.0 - 1.0 % Final     Neutrophils Absolute   Date Value Ref Range Status   03/11/2022 6.53 2.0 - 8.8 10*3/uL Final     Lymphocytes Absolute   Date Value Ref Range Status   03/11/2022 1.61 0.7 - 5.5 10*3/uL Final     Monocytes Absolute   Date Value Ref Range Status   03/11/2022 0.42 0.0 - 1.7 10*3/uL Final     Eosinophils Absolute   Date Value Ref Range Status   03/11/2022 0.30 0.0 - 0.8 10*3/uL Final     Basophils Absolute   Date Value Ref Range Status   03/11/2022 0.07 0.0 - 0.2 10*3/uL Final     Immature Grans, Absolute   Date Value Ref Range Status   03/11/2022 0.03 0.00 - 0.10 10*3/uL Final     nRBC   Date Value Ref Range Status   03/11/2022 0 0 /100(WBC) Final   02/23/2021 0.0 0.0 - 0.2 /100 WBC Final       Lab Results   Component Value Date    GLUCOSE 121 12/23/2020    BUN 16 03/11/2022    CREATININE 0.67 03/11/2022    EGFRIFNONA 106 12/23/2020    BCR 23.6 (H) 03/11/2022    CO2 21 (L) 03/11/2022    CALCIUM 8.8 03/11/2022    ALBUMIN 4.5 03/11/2022    LABIL2 1.7 03/11/2022    AST 16 03/11/2022    ALT 8 (L) 03/11/2022         Imaging Results (Last 7 Days)     ** No results found for the last 168 hours. **          I personally reviewed data as detailed below:     The labs listed above.    Office notes from: 2/23/21 hematology note         Assessment/Plan    1.  Chronic diarrhea: Worsening.  She was diagnosed with IBS in childhood however symptoms have persisted and in fact are worsening, including nocturnal stools which brings up concerns over IBD    2.  Abdominal cramping: With above    Plan  Celiac labs, TSH, and CRP today  Colonoscopy to rule out IBD, microscopic colitis  Discussed option of daily imodium vs colestipol vs hyoscyamine and she opts to try the  hyoscyamine to help with symptoms while this is being worked up  She does have a history of chronic anemia but has been diagnosed with thalassemia by hematology    Diagnoses and all orders for this visit:    1. Chronic diarrhea (Primary)  -     Case Request; Standing  -     Follow Anesthesia Guidelines / Standing Orders; Future  -     Obtain Informed Consent; Future  -     Case Request  -     IgA  -     Gliadin Antibody, IgG  -     Tissue Transglutaminase, IgA  -     Tissue Transglutaminase, IgG  -     TSH  -     C-reactive Protein    2. Abdominal cramping  -     Case Request; Standing  -     Follow Anesthesia Guidelines / Standing Orders; Future  -     Obtain Informed Consent; Future  -     Case Request  -     IgA  -     Gliadin Antibody, IgG  -     Tissue Transglutaminase, IgA  -     Tissue Transglutaminase, IgG  -     TSH  -     C-reactive Protein    Other orders  -     hyoscyamine (LEVSIN) 0.125 MG SL tablet; Take 1 tablet by mouth Every 6 (Six) Hours As Needed for Cramping or Diarrhea.  Dispense: 120 tablet; Refill: 1        I have discussed the above plan with the patient.  They verbalize understanding and are in agreement with the plan.  They have been advised to contact the office for any questions, concerns, or changes related to their health.    Dictated utilizing Dragon dictation

## 2022-04-12 LAB
CRP SERPL-MCNC: 7 MG/L (ref 0–10)
GLIADIN PEPTIDE IGG SER-ACNC: 2 UNITS (ref 0–19)
IGA SERPL-MCNC: 141 MG/DL (ref 87–352)
TSH SERPL DL<=0.005 MIU/L-ACNC: 1.46 UIU/ML (ref 0.45–4.5)
TTG IGA SER-ACNC: <2 U/ML (ref 0–3)
TTG IGG SER-ACNC: <2 U/ML (ref 0–5)

## 2022-04-15 NOTE — PROGRESS NOTES
Her celiac testing, thyroid testing, and inflammatory markers are all in normal range    Will see what her colonoscopy shows.

## 2022-04-18 ENCOUNTER — TELEPHONE (OUTPATIENT)
Dept: GASTROENTEROLOGY | Facility: CLINIC | Age: 37
End: 2022-04-18

## 2022-04-18 NOTE — TELEPHONE ENCOUNTER
----- Message from Harmony Purcell MD sent at 4/15/2022  3:28 PM EDT -----  Her celiac testing, thyroid testing, and inflammatory markers are all in normal range    Will see what her colonoscopy shows.

## 2022-06-07 ENCOUNTER — TELEPHONE (OUTPATIENT)
Dept: GASTROENTEROLOGY | Facility: CLINIC | Age: 37
End: 2022-06-07

## 2022-06-07 NOTE — TELEPHONE ENCOUNTER
Hi there, I’m so sorry my  had them in his office. I found them last night.      Thank you so much for getting back to me!     Joanna

## 2022-06-07 NOTE — TELEPHONE ENCOUNTER
----- Message from Joanna Niño sent at 6/6/2022  3:17 PM EDT -----  Regarding: Colonoscopy prep  Good afternoon,    Could I please obtain another copy of the colonoscopy prep for next week? I have been looking on [employer] for a copy and cannot seem to find one. I seem to have misplaced mine.     Thank you,    Joanna Negrete

## 2022-06-17 ENCOUNTER — ANESTHESIA (OUTPATIENT)
Dept: GASTROENTEROLOGY | Facility: HOSPITAL | Age: 37
End: 2022-06-17

## 2022-06-17 ENCOUNTER — ANESTHESIA EVENT (OUTPATIENT)
Dept: GASTROENTEROLOGY | Facility: HOSPITAL | Age: 37
End: 2022-06-17

## 2022-06-17 ENCOUNTER — HOSPITAL ENCOUNTER (OUTPATIENT)
Facility: HOSPITAL | Age: 37
Setting detail: HOSPITAL OUTPATIENT SURGERY
Discharge: HOME | End: 2022-06-17
Attending: INTERNAL MEDICINE | Admitting: INTERNAL MEDICINE

## 2022-06-17 VITALS
BODY MASS INDEX: 29.18 KG/M2 | RESPIRATION RATE: 16 BRPM | OXYGEN SATURATION: 98 % | DIASTOLIC BLOOD PRESSURE: 75 MMHG | WEIGHT: 170 LBS | SYSTOLIC BLOOD PRESSURE: 106 MMHG | HEART RATE: 79 BPM

## 2022-06-17 DIAGNOSIS — K52.9 CHRONIC DIARRHEA: ICD-10-CM

## 2022-06-17 DIAGNOSIS — R10.9 ABDOMINAL CRAMPING: ICD-10-CM

## 2022-06-17 LAB
B-HCG UR QL: NEGATIVE
EXPIRATION DATE: NORMAL
INTERNAL NEGATIVE CONTROL: NORMAL
INTERNAL POSITIVE CONTROL: NORMAL
Lab: NORMAL

## 2022-06-17 PROCEDURE — 88305 TISSUE EXAM BY PATHOLOGIST: CPT | Performed by: INTERNAL MEDICINE

## 2022-06-17 PROCEDURE — 45380 COLONOSCOPY AND BIOPSY: CPT | Performed by: INTERNAL MEDICINE

## 2022-06-17 PROCEDURE — S0260 H&P FOR SURGERY: HCPCS | Performed by: INTERNAL MEDICINE

## 2022-06-17 PROCEDURE — 25010000002 PROPOFOL 10 MG/ML EMULSION: Performed by: ANESTHESIOLOGY

## 2022-06-17 PROCEDURE — 81025 URINE PREGNANCY TEST: CPT | Performed by: INTERNAL MEDICINE

## 2022-06-17 PROCEDURE — 45385 COLONOSCOPY W/LESION REMOVAL: CPT | Performed by: INTERNAL MEDICINE

## 2022-06-17 PROCEDURE — 25010000002 ONDANSETRON PER 1 MG: Performed by: ANESTHESIOLOGY

## 2022-06-17 RX ORDER — SODIUM CHLORIDE, SODIUM LACTATE, POTASSIUM CHLORIDE, CALCIUM CHLORIDE 600; 310; 30; 20 MG/100ML; MG/100ML; MG/100ML; MG/100ML
30 INJECTION, SOLUTION INTRAVENOUS CONTINUOUS
Status: DISCONTINUED | OUTPATIENT
Start: 2022-06-17 | End: 2022-06-17 | Stop reason: HOSPADM

## 2022-06-17 RX ORDER — ONDANSETRON 2 MG/ML
INJECTION INTRAMUSCULAR; INTRAVENOUS AS NEEDED
Status: DISCONTINUED | OUTPATIENT
Start: 2022-06-17 | End: 2022-06-17 | Stop reason: SURG

## 2022-06-17 RX ORDER — EPHEDRINE SULFATE 50 MG/ML
INJECTION, SOLUTION INTRAVENOUS AS NEEDED
Status: DISCONTINUED | OUTPATIENT
Start: 2022-06-17 | End: 2022-06-17 | Stop reason: SURG

## 2022-06-17 RX ORDER — LIDOCAINE HYDROCHLORIDE 20 MG/ML
INJECTION, SOLUTION INFILTRATION; PERINEURAL AS NEEDED
Status: DISCONTINUED | OUTPATIENT
Start: 2022-06-17 | End: 2022-06-17 | Stop reason: SURG

## 2022-06-17 RX ORDER — PROPOFOL 10 MG/ML
VIAL (ML) INTRAVENOUS CONTINUOUS PRN
Status: DISCONTINUED | OUTPATIENT
Start: 2022-06-17 | End: 2022-06-17 | Stop reason: SURG

## 2022-06-17 RX ORDER — PROPOFOL 10 MG/ML
VIAL (ML) INTRAVENOUS AS NEEDED
Status: DISCONTINUED | OUTPATIENT
Start: 2022-06-17 | End: 2022-06-17 | Stop reason: SURG

## 2022-06-17 RX ORDER — PROMETHAZINE HYDROCHLORIDE 25 MG/1
25 TABLET ORAL ONCE AS NEEDED
Status: DISCONTINUED | OUTPATIENT
Start: 2022-06-17 | End: 2022-06-17 | Stop reason: HOSPADM

## 2022-06-17 RX ORDER — PROMETHAZINE HYDROCHLORIDE 25 MG/1
25 SUPPOSITORY RECTAL ONCE AS NEEDED
Status: DISCONTINUED | OUTPATIENT
Start: 2022-06-17 | End: 2022-06-17 | Stop reason: HOSPADM

## 2022-06-17 RX ADMIN — SODIUM CHLORIDE, POTASSIUM CHLORIDE, SODIUM LACTATE AND CALCIUM CHLORIDE 30 ML/HR: 600; 310; 30; 20 INJECTION, SOLUTION INTRAVENOUS at 11:28

## 2022-06-17 RX ADMIN — ONDANSETRON 4 MG: 2 INJECTION INTRAMUSCULAR; INTRAVENOUS at 13:02

## 2022-06-17 RX ADMIN — LIDOCAINE HYDROCHLORIDE 60 MG: 20 INJECTION, SOLUTION INFILTRATION; PERINEURAL at 12:34

## 2022-06-17 RX ADMIN — PROPOFOL 150 MG: 10 INJECTION, EMULSION INTRAVENOUS at 12:34

## 2022-06-17 RX ADMIN — EPHEDRINE SULFATE 10 MG: 50 INJECTION INTRAVENOUS at 12:56

## 2022-06-17 RX ADMIN — Medication 200 MCG/KG/MIN: at 12:34

## 2022-06-17 NOTE — ANESTHESIA PREPROCEDURE EVALUATION
Anesthesia Evaluation     Patient summary reviewed   history of anesthetic complications (hx nausea post SAB): PONV  NPO Solid Status: > 8 hours  NPO Liquid Status: > 2 hours           Airway   Mallampati: II  Dental - normal exam     Pulmonary - normal exam   Cardiovascular     Rhythm: regular        Neuro/Psych  (+) psychiatric history Anxiety,    GI/Hepatic/Renal/Endo      Musculoskeletal     Abdominal    Substance History      OB/GYN    (-)  Pregnant        Other   blood dyscrasia (Beta thalasemia minor) anemia,                       Anesthesia Plan    ASA 2     MAC       Anesthetic plan, risks, benefits, and alternatives have been provided, discussed and informed consent has been obtained with: patient.    Plan discussed with CRNA.

## 2022-06-17 NOTE — H&P
[facility]  Pre Procedure History & Physical    Chief Complaint: diarrhea      HPI: 36 y.o. female with a past medical history noted below who presents for diarrhea.  She has had issues since childhood.     She describes watery stools, 6-8 times per day.  Rare nocturnal stools, about 5 times per month.  No associated weight loss but she does notice more fatigue with symptoms.  There is associated cramping.  Rare red blood from irritation.  She does take imodium about twice per week, about 4mg.  Usually helps but sometimes does not.       Symptoms worsening and interfering with activities of daily living     Mild chronic anemia, she has a history of thalassemia minor     She reports workup in her middle school years including a colonoscopy and was told that she had IBS.  She has not been on any prescription medication.     She has had 2 c sections.       No family hx of IBD.  Paternal grandmother with CRC, father with history of polyps with frequent colonoscopies.     No smoking, no excessive ETOH     Works as a dermatology NP.      Past Medical History:   Past Medical History:   Diagnosis Date   • Anemia    • Anxiety    • Beta minor thalassemia    • Chronic shoulder pain    • IBS (irritable bowel syndrome)    • Migraine        Family History:  Family History   Problem Relation Age of Onset   • Anemia Mother    • Anxiety disorder Mother    • Anemia Sister    • Irritable bowel syndrome Sister    • Cancer Other    • Hypertension Other    • Anxiety disorder Father    • Colon polyps Father    • Alcohol abuse Father         Recovered in 1995   • Stroke Maternal Grandmother    • Suicidality Maternal Grandfather    • Colon cancer Paternal Grandmother    • Colon polyps Paternal Grandmother    • Irritable bowel syndrome Paternal Grandmother    • Colon cancer Paternal Uncle        Social History:   reports that she has never smoked. She has never used smokeless tobacco. She reports current alcohol use  of about 4.0 standard drinks of alcohol per week. She reports that she does not use drugs.    Medications:   No medications prior to admission.       Allergies:  Shellfish-derived products    ROS:    Pertinent items are noted in HPI     Objective     not currently breastfeeding.    Physical Exam   Constitutional: Pt is oriented to person, place, and time and well-developed, well-nourished, and in no distress.   HENT:   Mouth/Throat: Oropharynx is clear and moist.   Neck: Normal range of motion. Neck supple.   Cardiovascular: Normal rate, regular rhythm and normal heart sounds.    Pulmonary/Chest: Effort normal and breath sounds normal. No respiratory distress. No  wheezes.   Abdominal: Soft. Bowel sounds are normal.   Skin: Skin is warm and dry.   Psychiatric: Mood, memory, affect and judgment normal.     Assessment & Plan     Diagnosis:  diarrhea      Anticipated Surgical Procedure:    Colonoscopy    The risks, benefits, and alternatives of this procedure have been discussed with the patient or the responsible party- the patient understands and agrees to proceed.

## 2022-06-17 NOTE — ANESTHESIA POSTPROCEDURE EVALUATION
Patient: Joanna Niño    Procedure Summary     Date: 06/17/22 Room / Location:  [facility]    Anesthesia Start: 1229 Anesthesia Stop: 1303    Procedure: COLONOSCOPY INTO CECUM AND T.I. WITH COLD BIOPSIES AND COLD SNARE POLYPECTOMY (N/A ) Diagnosis:       Chronic diarrhea      Abdominal cramping      (Chronic diarrhea [K52.9])      (Abdominal cramping [R10.9])    Surgeons: Harmony Purcell MD Provider: Gabriel Soliman MD    Anesthesia Type: MAC ASA Status: 2          Anesthesia Type: MAC    Vitals  No vitals data found for the desired time range.          Post Anesthesia Care and Evaluation    Patient location during evaluation: PHASE II  Patient participation: complete - patient participated  Level of consciousness: awake and alert  Pain management: adequate    Airway patency: patent  Anesthetic complications: No anesthetic complications  PONV Status: controlled  Cardiovascular status: acceptable and hemodynamically stable  Respiratory status: acceptable, nonlabored ventilation and spontaneous ventilation  Hydration status: acceptable

## 2022-06-20 LAB
LAB AP CASE REPORT: NORMAL
PATH REPORT.FINAL DX SPEC: NORMAL
PATH REPORT.GROSS SPEC: NORMAL

## 2022-06-23 NOTE — PROGRESS NOTES
Her colon polyp was a tubular adenoma.    The random colon biopsy showed normal colon tissue with no evidence of microscopic colitis    Please place in 5-year colonoscopy recall.    Continue hyoscyamine as needed for diarrhea

## 2022-06-24 ENCOUNTER — TELEPHONE (OUTPATIENT)
Dept: GASTROENTEROLOGY | Facility: CLINIC | Age: 37
End: 2022-06-24

## 2022-06-24 NOTE — TELEPHONE ENCOUNTER
----- Message from Harmony Purcell MD sent at 6/23/2022  1:53 PM EDT -----  Her colon polyp was a tubular adenoma.    The random colon biopsy showed normal colon tissue with no evidence of microscopic colitis    Please place in 5-year colonoscopy recall.    Continue hyoscyamine as needed for diarrhea

## 2022-07-13 ENCOUNTER — TELEPHONE (OUTPATIENT)
Dept: GASTROENTEROLOGY | Facility: CLINIC | Age: 37
End: 2022-07-13

## 2022-07-13 NOTE — TELEPHONE ENCOUNTER
----- Message from Joanna Niño sent at 7/12/2022  6:26 PM EDT -----  Regarding: Appointment Monday 7/18  Hi there,    I was wondering if this can be a tele health follow up visit? I will not have childcare and hate to reschedule.     Thank you,    Joanna Negrete

## 2022-10-04 ENCOUNTER — TELEPHONE (OUTPATIENT)
Dept: GASTROENTEROLOGY | Facility: CLINIC | Age: 37
End: 2022-10-04

## 2022-10-04 NOTE — TELEPHONE ENCOUNTER
"Caller: Joanna Niño    Relationship to patient: Self    Best call back number: [phone]    Chief complaint: 3 MO FOLLOW UP    Type of visit: TELEPHONE    Requested date: TBD    If rescheduling, when is the original appointment: 9/20/22 @ 3:30PM    Additional notes: PATIENT STATED SHE DID THE E CHECK IN AND RECEIVED CONFIRMATIONS ABOUT CHECK IN AND WAITED FOR OVER AN HOUR WITH NO PHONE CALL AND WAS MARKED AS A NO SHOW. PLEASE CORRECT \"NO SHOW\" STATUS IF POSSIBLE AND CALL PATIENT TO RESCHEDULE     "

## 2022-10-25 ENCOUNTER — TELEMEDICINE (OUTPATIENT)
Dept: GASTROENTEROLOGY | Facility: CLINIC | Age: 37
End: 2022-10-25

## 2022-10-25 VITALS — HEIGHT: 64 IN | WEIGHT: 170 LBS | BODY MASS INDEX: 29.02 KG/M2

## 2022-10-25 DIAGNOSIS — R10.9 ABDOMINAL CRAMPING: Chronic | ICD-10-CM

## 2022-10-25 DIAGNOSIS — K52.9 CHRONIC DIARRHEA: Primary | Chronic | ICD-10-CM

## 2022-10-25 DIAGNOSIS — R14.0 BLOATING: Chronic | ICD-10-CM

## 2022-10-25 PROCEDURE — 99214 OFFICE O/P EST MOD 30 MIN: CPT | Performed by: INTERNAL MEDICINE

## 2022-10-25 RX ORDER — ONDANSETRON 8 MG/1
TABLET, ORALLY DISINTEGRATING ORAL
COMMUNITY
Start: 2022-10-17

## 2022-10-25 RX ORDER — ESCITALOPRAM OXALATE 10 MG/1
10 TABLET ORAL DAILY
Qty: 30 TABLET | Refills: 11 | COMMUNITY
Start: 2022-08-22 | End: 2023-08-22

## 2022-10-25 RX ORDER — RIMEGEPANT SULFATE 75 MG/75MG
TABLET, ORALLY DISINTEGRATING ORAL
COMMUNITY
Start: 2022-09-29

## 2022-10-25 NOTE — PROGRESS NOTES
Chief Complaint   Patient presents with   • Diarrhea       Subjective     HPI    Joanna Niño is a 36 y.o. female with a past medical history noted below who presents for follow-up of chronic diarrhea.  She underwent colonoscopy June 17, 2022 with normal random colon biopsies.  She had 1 small tubular adenoma polyp.  Given family history of colon polyps, is recommended that she repeat colonoscopy in 5 years.    At work, taking hyoscyamine three times daily.  Finds it is short acting but does well for symptoms, helping her have less diarrhea and cramping..  She takes it with breakfast, lunch and sometimes after work depending on what she is doing.  Does not take it on days she is not at work.  Has not added in a fiber supplement at present, feels her pill burden is already pretty high.    Also finding that she does better when she does a low carbohydrate diet, less bloating and gas.    This was an audio and video enabled telemedicine encounter.      You have chosen to receive care through a telehealth visit.  Do you consent to use a video/audio connection for your medical care today? Yes        Current Outpatient Medications:   •  escitalopram (LEXAPRO) 10 MG tablet, Take 1 tablet by mouth Daily., Disp: 30 tablet, Rfl: 11  •  hyoscyamine (LEVSIN) 0.125 MG SL tablet, Take 2 tablets by mouth Every 6 (Six) Hours As Needed for Cramping or Diarrhea., Disp: 360 tablet, Rfl: 3  •  ibuprofen (ADVIL,MOTRIN) 600 MG tablet, Take 1 tablet by mouth Every 8 (Eight) Hours As Needed for Mild Pain ., Disp: 30 tablet, Rfl: 0  •  Junel FE 1.5/30 1.5-30 MG-MCG tablet, , Disp: , Rfl:   •  Nurtec 75 MG dispersible tablet, , Disp: , Rfl:   •  ondansetron ODT (ZOFRAN-ODT) 8 MG disintegrating tablet, , Disp: , Rfl:   •  SUMAtriptan (IMITREX) 100 MG tablet, Take 100 mg by mouth Every 2 (Two) Hours As Needed for Migraine. Take one tablet at onset of headache. May repeat dose one time in 2 hours if headache not relieved., Disp: , Rfl:    •  SUMAtriptan (IMITREX) 50 MG tablet, Take 50 mg by mouth Every 2 (Two) Hours As Needed for Migraine. Take one tablet at onset of headache. May repeat dose one time in 2 hours if headache not relieved., Disp: , Rfl:   •  topiramate (TOPAMAX) 25 MG tablet, Take 75 mg by mouth Daily., Disp: , Rfl:       Objective     There were no vitals filed for this visit.      10/25/22  1544   Weight: 77.1 kg (170 lb)     Body mass index is 29.18 kg/m².    Physical Exam        WBC   Date Value Ref Range Status   03/11/2022 8.96 4.5 - 11.0 10*3/uL Final     RBC   Date Value Ref Range Status   03/11/2022 5.66 (H) 4.0 - 5.2 10*6/uL Final     Hemoglobin   Date Value Ref Range Status   03/11/2022 11.4 (L) 12.0 - 16.0 g/dL Final     Hematocrit   Date Value Ref Range Status   03/11/2022 39.9 36.0 - 46.0 % Final     MCV   Date Value Ref Range Status   03/11/2022 70.5 (L) 80.0 - 100.0 fL Final     MCH   Date Value Ref Range Status   03/11/2022 20.1 (L) 26.0 - 34.0 pg Final     MCHC   Date Value Ref Range Status   03/11/2022 28.6 (L) 31.0 - 37.0 g/dL Final     RDW   Date Value Ref Range Status   03/11/2022 15.9 12.0 - 16.8 % Final     RDW-SD   Date Value Ref Range Status   02/23/2021 33.2 (L) 37.0 - 54.0 fl Final     MPV   Date Value Ref Range Status   03/11/2022 12.1 8.4 - 12.4 fL Final     Platelets   Date Value Ref Range Status   03/11/2022 358 140 - 440 10*3/uL Final     Neutrophil Rel %   Date Value Ref Range Status   03/11/2022 72.9 45 - 80 % Final     Lymphocyte Rel %   Date Value Ref Range Status   03/11/2022 18.0 15 - 50 % Final     Monocyte Rel %   Date Value Ref Range Status   03/11/2022 4.7 0 - 15 % Final     Eosinophil %   Date Value Ref Range Status   03/11/2022 3.3 0 - 7 % Final     Basophil Rel %   Date Value Ref Range Status   03/11/2022 0.8 0 - 2 % Final     Immature Grans %   Date Value Ref Range Status   03/11/2022 0.3 0.0 - 1.0 % Final     Neutrophils Absolute   Date Value Ref Range Status   03/11/2022 6.53 2.0 - 8.8  10*3/uL Final     Lymphocytes Absolute   Date Value Ref Range Status   03/11/2022 1.61 0.7 - 5.5 10*3/uL Final     Monocytes Absolute   Date Value Ref Range Status   03/11/2022 0.42 0.0 - 1.7 10*3/uL Final     Eosinophils Absolute   Date Value Ref Range Status   03/11/2022 0.30 0.0 - 0.8 10*3/uL Final     Basophils Absolute   Date Value Ref Range Status   03/11/2022 0.07 0.0 - 0.2 10*3/uL Final     Immature Grans, Absolute   Date Value Ref Range Status   03/11/2022 0.03 0.00 - 0.10 10*3/uL Final     nRBC   Date Value Ref Range Status   03/11/2022 0 0 /100(WBC) Final   02/23/2021 0.0 0.0 - 0.2 /100 WBC Final       Lab Results   Component Value Date    GLUCOSE 121 12/23/2020    BUN 16 03/11/2022    CREATININE 0.67 03/11/2022    EGFRIFNONA 106 12/23/2020    BCR 23.6 (H) 03/11/2022    CO2 21 (L) 03/11/2022    CALCIUM 8.8 03/11/2022    ALBUMIN 4.5 03/11/2022    LABIL2 1.7 03/11/2022    AST 16 03/11/2022    ALT 8 (L) 03/11/2022         Imaging Results (Last 7 Days)     ** No results found for the last 168 hours. **          I personally reviewed data as detailed below:     Endoscopy procedures and pathology from: 6/17/22 colonoscopy    Assessment and Plan         Diagnoses and all orders for this visit:    1. Chronic diarrhea (Primary)    2. Abdominal cramping    3. Bloating    Other orders  -     hyoscyamine (LEVSIN) 0.125 MG SL tablet; Take 2 tablets by mouth Every 6 (Six) Hours As Needed for Cramping or Diarrhea.  Dispense: 360 tablet; Refill: 3    Plan  Will increase hyoscyamine to 2 tablets 3 times daily as needed  She will consider adding a fiber supplement such as FiberCon  Advised that she try gluten-free for a total of 4 weeks to see if this improves her symptoms, additionally, she can review [facility]'s FODMAPS diet website      I have discussed the above plan with the patient.  They verbalize understanding and are in agreement with the plan.  They have been advised to contact the office for any  questions, concerns, or changes related to their health.    Dictated utilizing Dragon dictation

## (undated) DEVICE — KENDALL SCD EXPRESS SLEEVES, KNEE LENGTH, MEDIUM: Brand: KENDALL SCD

## (undated) DEVICE — 3M(TM) TEGADERM(TM) TRANSPARENT FILM DRESSING FRAME STYLE 1627: Brand: 3M™ TEGADERM™

## (undated) DEVICE — NDL BLNT 18G 1 1/2IN

## (undated) DEVICE — 3M™ STERI-STRIP™ COMPOUND BENZOIN TINCTURE 40 BAGS/CARTON 4 CARTONS/CASE C1544: Brand: 3M™ STERI-STRIP™

## (undated) DEVICE — GLV SURG BIOGEL LTX PF 6 1/2

## (undated) DEVICE — SINGLE-USE BIOPSY FORCEPS: Brand: RADIAL JAW 4

## (undated) DEVICE — KT ART BLD GAS QUICK DRAW

## (undated) DEVICE — ANTIBACTERIAL UNDYED BRAIDED (POLYGLACTIN 910), SYNTHETIC ABSORBABLE SUTURE: Brand: COATED VICRYL

## (undated) DEVICE — CANN O2 ETCO2 FITS ALL CONN CO2 SMPL A/ 7IN DISP LF

## (undated) DEVICE — 3M™ STERI-STRIP™ REINFORCED ADHESIVE SKIN CLOSURES, R1547, 1/2 IN X 4 IN (12 MM X 100 MM), 6 STRIPS/ENVELOPE: Brand: 3M™ STERI-STRIP™

## (undated) DEVICE — ADAPT CLN BIOGUARD AIR/H2O DISP

## (undated) DEVICE — SUT VIC 3/0 CTI 36IN J944H

## (undated) DEVICE — LN SMPL CO2 SHTRM SD STREAM W/M LUER

## (undated) DEVICE — GLV SURG BIOGEL LTX PF 6

## (undated) DEVICE — SUT GUT CHRM 0 CT 27IN 914H

## (undated) DEVICE — SOL IRR NACL 0.9PCT BT 1000ML

## (undated) DEVICE — KT ORCA ORCAPOD DISP STRL

## (undated) DEVICE — SUT VIC 0 CT1 36IN J946H

## (undated) DEVICE — SUT VIC 4/0 KS 27IN VCP662H

## (undated) DEVICE — SNAR POLYP CAPTIVATOR RND STFF 2.4 240CM 10MM 1P/U

## (undated) DEVICE — TUBING, SUCTION, 1/4" X 10', STRAIGHT: Brand: MEDLINE

## (undated) DEVICE — SUT VIC 4/0 KS 27IN J662H

## (undated) DEVICE — SENSR O2 OXIMAX FNGR A/ 18IN NONSTR

## (undated) DEVICE — THE SINGLE USE ETRAP – POLYP TRAP IS USED FOR SUCTION RETRIEVAL OF ENDOSCOPICALLY REMOVED POLYPS.: Brand: ETRAP